# Patient Record
Sex: MALE | Race: BLACK OR AFRICAN AMERICAN | NOT HISPANIC OR LATINO | Employment: OTHER | URBAN - METROPOLITAN AREA
[De-identification: names, ages, dates, MRNs, and addresses within clinical notes are randomized per-mention and may not be internally consistent; named-entity substitution may affect disease eponyms.]

---

## 2018-09-03 ENCOUNTER — HOSPITAL ENCOUNTER (OUTPATIENT)
Facility: OTHER | Age: 73
Discharge: HOME | End: 2018-09-04
Attending: EMERGENCY MEDICINE | Admitting: EMERGENCY MEDICINE
Payer: MEDICARE

## 2018-09-03 DIAGNOSIS — R79.89 ELEVATED TROPONIN: ICD-10-CM

## 2018-09-03 DIAGNOSIS — I50.23 ACUTE ON CHRONIC SYSTOLIC CHF (CONGESTIVE HEART FAILURE): Primary | ICD-10-CM

## 2018-09-03 DIAGNOSIS — R11.2 NAUSEA AND VOMITING IN ADULT: ICD-10-CM

## 2018-09-03 DIAGNOSIS — F43.29 STRESS AND ADJUSTMENT REACTION: ICD-10-CM

## 2018-09-03 DIAGNOSIS — G47.00 INSOMNIA, UNSPECIFIED TYPE: ICD-10-CM

## 2018-09-03 DIAGNOSIS — Z91.89 AT RISK FOR CORONARY ARTERY DISEASE: ICD-10-CM

## 2018-09-03 DIAGNOSIS — I25.10 CAD (CORONARY ARTERY DISEASE): ICD-10-CM

## 2018-09-03 PROBLEM — I10 ESSENTIAL HYPERTENSION: Status: ACTIVE | Noted: 2018-09-03

## 2018-09-03 LAB
ALBUMIN SERPL BCP-MCNC: 3.3 G/DL
ALP SERPL-CCNC: 68 U/L
ALT SERPL W/O P-5'-P-CCNC: 10 U/L
ANION GAP SERPL CALC-SCNC: 11 MMOL/L
AST SERPL-CCNC: 14 U/L
BACTERIA #/AREA URNS HPF: ABNORMAL /HPF
BASOPHILS # BLD AUTO: 0.02 K/UL
BASOPHILS NFR BLD: 0.2 %
BILIRUB SERPL-MCNC: 1 MG/DL
BILIRUB UR QL STRIP: NEGATIVE
BUN SERPL-MCNC: 18 MG/DL
CALCIUM SERPL-MCNC: 9 MG/DL
CHLORIDE SERPL-SCNC: 103 MMOL/L
CLARITY UR: CLEAR
CO2 SERPL-SCNC: 22 MMOL/L
COLOR UR: YELLOW
CREAT SERPL-MCNC: 1.5 MG/DL
DIFFERENTIAL METHOD: ABNORMAL
EOSINOPHIL # BLD AUTO: 0.1 K/UL
EOSINOPHIL NFR BLD: 0.5 %
ERYTHROCYTE [DISTWIDTH] IN BLOOD BY AUTOMATED COUNT: 14.1 %
EST. GFR  (AFRICAN AMERICAN): 52 ML/MIN/1.73 M^2
EST. GFR  (NON AFRICAN AMERICAN): 45 ML/MIN/1.73 M^2
GLUCOSE SERPL-MCNC: 156 MG/DL
GLUCOSE UR QL STRIP: NEGATIVE
HCT VFR BLD AUTO: 36.6 %
HGB BLD-MCNC: 12.1 G/DL
HGB UR QL STRIP: ABNORMAL
KETONES UR QL STRIP: NEGATIVE
LEUKOCYTE ESTERASE UR QL STRIP: ABNORMAL
LIPASE SERPL-CCNC: 22 U/L
LYMPHOCYTES # BLD AUTO: 1.6 K/UL
LYMPHOCYTES NFR BLD: 15.7 %
MCH RBC QN AUTO: 27.3 PG
MCHC RBC AUTO-ENTMCNC: 33.1 G/DL
MCV RBC AUTO: 83 FL
MICROSCOPIC COMMENT: ABNORMAL
MONOCYTES # BLD AUTO: 0.9 K/UL
MONOCYTES NFR BLD: 8.4 %
NEUTROPHILS # BLD AUTO: 7.8 K/UL
NEUTROPHILS NFR BLD: 75 %
NITRITE UR QL STRIP: NEGATIVE
PH UR STRIP: 6 [PH] (ref 5–8)
PLATELET # BLD AUTO: 123 K/UL
PMV BLD AUTO: 11.9 FL
POTASSIUM SERPL-SCNC: 4.1 MMOL/L
PROT SERPL-MCNC: 7.4 G/DL
PROT UR QL STRIP: ABNORMAL
RBC # BLD AUTO: 4.43 M/UL
RBC #/AREA URNS HPF: 5 /HPF (ref 0–4)
SODIUM SERPL-SCNC: 136 MMOL/L
SP GR UR STRIP: 1.02 (ref 1–1.03)
SQUAMOUS #/AREA URNS HPF: 8 /HPF
TROPONIN I SERPL DL<=0.01 NG/ML-MCNC: 0.03 NG/ML
TROPONIN I SERPL DL<=0.01 NG/ML-MCNC: 0.03 NG/ML
TROPONIN I SERPL DL<=0.01 NG/ML-MCNC: 0.04 NG/ML
TROPONIN I SERPL DL<=0.01 NG/ML-MCNC: 0.04 NG/ML
URN SPEC COLLECT METH UR: ABNORMAL
UROBILINOGEN UR STRIP-ACNC: NEGATIVE EU/DL
WBC # BLD AUTO: 10.34 K/UL
WBC #/AREA URNS HPF: 5 /HPF (ref 0–5)
WBC CLUMPS URNS QL MICRO: ABNORMAL
YEAST URNS QL MICRO: ABNORMAL

## 2018-09-03 PROCEDURE — 83690 ASSAY OF LIPASE: CPT

## 2018-09-03 PROCEDURE — G0378 HOSPITAL OBSERVATION PER HR: HCPCS

## 2018-09-03 PROCEDURE — 63600175 PHARM REV CODE 636 W HCPCS: Performed by: NURSE PRACTITIONER

## 2018-09-03 PROCEDURE — 99220 PR INITIAL OBSERVATION CARE,LEVL III: CPT | Mod: ,,, | Performed by: HOSPITALIST

## 2018-09-03 PROCEDURE — 80053 COMPREHEN METABOLIC PANEL: CPT

## 2018-09-03 PROCEDURE — 94761 N-INVAS EAR/PLS OXIMETRY MLT: CPT

## 2018-09-03 PROCEDURE — 99284 EMERGENCY DEPT VISIT MOD MDM: CPT | Mod: 25

## 2018-09-03 PROCEDURE — 63600175 PHARM REV CODE 636 W HCPCS: Performed by: EMERGENCY MEDICINE

## 2018-09-03 PROCEDURE — 85025 COMPLETE CBC W/AUTO DIFF WBC: CPT

## 2018-09-03 PROCEDURE — 84484 ASSAY OF TROPONIN QUANT: CPT

## 2018-09-03 PROCEDURE — 81000 URINALYSIS NONAUTO W/SCOPE: CPT

## 2018-09-03 PROCEDURE — 25000003 PHARM REV CODE 250: Performed by: INTERNAL MEDICINE

## 2018-09-03 PROCEDURE — 96361 HYDRATE IV INFUSION ADD-ON: CPT

## 2018-09-03 PROCEDURE — 25000003 PHARM REV CODE 250: Performed by: NURSE PRACTITIONER

## 2018-09-03 PROCEDURE — 93005 ELECTROCARDIOGRAM TRACING: CPT

## 2018-09-03 PROCEDURE — 36415 COLL VENOUS BLD VENIPUNCTURE: CPT

## 2018-09-03 PROCEDURE — 96374 THER/PROPH/DIAG INJ IV PUSH: CPT

## 2018-09-03 PROCEDURE — 93010 ELECTROCARDIOGRAM REPORT: CPT | Mod: ,,, | Performed by: INTERNAL MEDICINE

## 2018-09-03 PROCEDURE — 84484 ASSAY OF TROPONIN QUANT: CPT | Mod: 91

## 2018-09-03 PROCEDURE — 25000003 PHARM REV CODE 250: Performed by: EMERGENCY MEDICINE

## 2018-09-03 RX ORDER — DIPHENHYDRAMINE HCL 25 MG
25 CAPSULE ORAL EVERY 6 HOURS PRN
Status: DISCONTINUED | OUTPATIENT
Start: 2018-09-03 | End: 2018-09-04 | Stop reason: HOSPADM

## 2018-09-03 RX ORDER — ASPIRIN 81 MG/1
81 TABLET ORAL DAILY
Status: DISCONTINUED | OUTPATIENT
Start: 2018-09-03 | End: 2018-09-04 | Stop reason: HOSPADM

## 2018-09-03 RX ORDER — CARVEDILOL 12.5 MG/1
12.5 TABLET ORAL 2 TIMES DAILY WITH MEALS
Status: DISCONTINUED | OUTPATIENT
Start: 2018-09-03 | End: 2018-09-04 | Stop reason: HOSPADM

## 2018-09-03 RX ORDER — HEPARIN SODIUM 5000 [USP'U]/ML
5000 INJECTION, SOLUTION INTRAVENOUS; SUBCUTANEOUS EVERY 8 HOURS
Status: DISCONTINUED | OUTPATIENT
Start: 2018-09-03 | End: 2018-09-04 | Stop reason: HOSPADM

## 2018-09-03 RX ORDER — ASPIRIN 81 MG/1
81 TABLET ORAL DAILY
COMMUNITY

## 2018-09-03 RX ORDER — ONDANSETRON 2 MG/ML
4 INJECTION INTRAMUSCULAR; INTRAVENOUS
Status: COMPLETED | OUTPATIENT
Start: 2018-09-03 | End: 2018-09-03

## 2018-09-03 RX ORDER — ONDANSETRON 8 MG/1
8 TABLET, ORALLY DISINTEGRATING ORAL EVERY 8 HOURS PRN
Status: DISCONTINUED | OUTPATIENT
Start: 2018-09-03 | End: 2018-09-04 | Stop reason: HOSPADM

## 2018-09-03 RX ORDER — LOSARTAN POTASSIUM 100 MG/1
100 TABLET ORAL DAILY
COMMUNITY

## 2018-09-03 RX ORDER — MORPHINE SULFATE 2 MG/ML
2 INJECTION, SOLUTION INTRAMUSCULAR; INTRAVENOUS EVERY 4 HOURS PRN
Status: DISCONTINUED | OUTPATIENT
Start: 2018-09-03 | End: 2018-09-04 | Stop reason: HOSPADM

## 2018-09-03 RX ORDER — ASPIRIN 325 MG
325 TABLET, DELAYED RELEASE (ENTERIC COATED) ORAL
Status: COMPLETED | OUTPATIENT
Start: 2018-09-03 | End: 2018-09-03

## 2018-09-03 RX ORDER — ATORVASTATIN CALCIUM 20 MG/1
40 TABLET, FILM COATED ORAL DAILY
Status: DISCONTINUED | OUTPATIENT
Start: 2018-09-03 | End: 2018-09-04 | Stop reason: HOSPADM

## 2018-09-03 RX ORDER — SODIUM CHLORIDE 0.9 % (FLUSH) 0.9 %
5 SYRINGE (ML) INJECTION
Status: DISCONTINUED | OUTPATIENT
Start: 2018-09-03 | End: 2018-09-04 | Stop reason: HOSPADM

## 2018-09-03 RX ORDER — LOSARTAN POTASSIUM 50 MG/1
100 TABLET ORAL DAILY
Status: DISCONTINUED | OUTPATIENT
Start: 2018-09-03 | End: 2018-09-04 | Stop reason: HOSPADM

## 2018-09-03 RX ORDER — SODIUM CHLORIDE 9 MG/ML
1000 INJECTION, SOLUTION INTRAVENOUS
Status: COMPLETED | OUTPATIENT
Start: 2018-09-03 | End: 2018-09-03

## 2018-09-03 RX ORDER — CARVEDILOL 12.5 MG/1
12.5 TABLET ORAL 2 TIMES DAILY WITH MEALS
COMMUNITY

## 2018-09-03 RX ORDER — ACETAMINOPHEN 325 MG/1
650 TABLET ORAL EVERY 4 HOURS PRN
Status: DISCONTINUED | OUTPATIENT
Start: 2018-09-03 | End: 2018-09-04 | Stop reason: HOSPADM

## 2018-09-03 RX ADMIN — HEPARIN SODIUM 5000 UNITS: 5000 INJECTION, SOLUTION INTRAVENOUS; SUBCUTANEOUS at 01:09

## 2018-09-03 RX ADMIN — HEPARIN SODIUM 5000 UNITS: 5000 INJECTION, SOLUTION INTRAVENOUS; SUBCUTANEOUS at 06:09

## 2018-09-03 RX ADMIN — HEPARIN SODIUM 5000 UNITS: 5000 INJECTION, SOLUTION INTRAVENOUS; SUBCUTANEOUS at 09:09

## 2018-09-03 RX ADMIN — ATORVASTATIN CALCIUM 40 MG: 20 TABLET, FILM COATED ORAL at 01:09

## 2018-09-03 RX ADMIN — DIPHENHYDRAMINE HYDROCHLORIDE 25 MG: 25 CAPSULE ORAL at 06:09

## 2018-09-03 RX ADMIN — LOSARTAN POTASSIUM 100 MG: 50 TABLET, FILM COATED ORAL at 08:09

## 2018-09-03 RX ADMIN — CARVEDILOL 12.5 MG: 12.5 TABLET, FILM COATED ORAL at 08:09

## 2018-09-03 RX ADMIN — CARVEDILOL 12.5 MG: 12.5 TABLET, FILM COATED ORAL at 05:09

## 2018-09-03 RX ADMIN — ASPIRIN 81 MG: 81 TABLET, COATED ORAL at 08:09

## 2018-09-03 RX ADMIN — ONDANSETRON 4 MG: 2 INJECTION, SOLUTION INTRAMUSCULAR; INTRAVENOUS at 01:09

## 2018-09-03 RX ADMIN — ASPIRIN 325 MG: 325 TABLET, DELAYED RELEASE ORAL at 04:09

## 2018-09-03 RX ADMIN — SODIUM CHLORIDE 1000 ML: 0.9 INJECTION, SOLUTION INTRAVENOUS at 01:09

## 2018-09-03 NOTE — SUBJECTIVE & OBJECTIVE
Past Medical History:   Diagnosis Date    Heart attack     Hypertension     Prostate cancer        Past Surgical History:   Procedure Laterality Date    CARDIAC SURGERY         Review of patient's allergies indicates:  No Known Allergies    No current facility-administered medications on file prior to encounter.      Current Outpatient Medications on File Prior to Encounter   Medication Sig    aspirin (ECOTRIN) 81 MG EC tablet Take 81 mg by mouth once daily.    carvedilol (COREG) 12.5 MG tablet Take 12.5 mg by mouth 2 (two) times daily with meals.    losartan (COZAAR) 100 MG tablet Take 100 mg by mouth once daily.     Family History     None        Tobacco Use    Smoking status: Never Smoker   Substance and Sexual Activity    Alcohol use: No     Frequency: Never    Drug use: Not on file    Sexual activity: Not on file     Review of Systems   Constitutional: Positive for activity change, appetite change and fatigue. Negative for fever.   HENT: Negative for congestion, ear pain and postnasal drip.    Eyes: Negative for discharge.   Respiratory: Positive for shortness of breath. Negative for apnea and wheezing.    Cardiovascular: Negative for chest pain and leg swelling.   Gastrointestinal: Positive for nausea and vomiting. Negative for abdominal distention and abdominal pain.   Endocrine: Negative for polydipsia, polyphagia and polyuria.   Genitourinary: Negative for difficulty urinating, flank pain, frequency, hematuria and urgency.   Musculoskeletal: Positive for arthralgias and myalgias. Negative for joint swelling.   Skin: Negative for pallor and rash.   Allergic/Immunologic: Negative for environmental allergies and food allergies.   Neurological: Negative for dizziness, speech difficulty, weakness, light-headedness and headaches.   Hematological: Does not bruise/bleed easily.   Psychiatric/Behavioral: Negative for agitation.     Objective:     Vital Signs (Most Recent):  Temp: 99.4 °F (37.4 °C)  (09/03/18 0101)  Pulse: (!) 58 (09/03/18 0411)  Resp: 18 (09/03/18 0411)  BP: (!) 163/73 (09/03/18 0411)  SpO2: 96 % (09/03/18 0411) Vital Signs (24h Range):  Temp:  [99.4 °F (37.4 °C)] 99.4 °F (37.4 °C)  Pulse:  [58-81] 58  Resp:  [16-18] 18  SpO2:  [95 %-98 %] 96 %  BP: (152-197)/(71-94) 163/73     Weight: 83.9 kg (185 lb)  Body mass index is 21.94 kg/m².    Physical Exam   Constitutional: He is oriented to person, place, and time. He appears well-developed and well-nourished.   HENT:   Head: Normocephalic.   Eyes: Conjunctivae are normal.   Neck: Normal range of motion. Neck supple.   Cardiovascular: Regular rhythm, normal heart sounds and intact distal pulses. Bradycardia present.   Pulses:       Radial pulses are 2+ on the right side, and 2+ on the left side.   Pulmonary/Chest: Effort normal and breath sounds normal. Tachypnea noted.   Abdominal: Soft. He exhibits no distension. Bowel sounds are decreased. There is no tenderness.   Musculoskeletal: Normal range of motion.   Neurological: He is alert and oriented to person, place, and time. He has normal strength. GCS eye subscore is 4. GCS verbal subscore is 5. GCS motor subscore is 6.   Skin: Skin is warm and dry.   Psychiatric: He has a normal mood and affect. His speech is normal and behavior is normal.           Significant Labs:   CBC:   Recent Labs   Lab  09/03/18 0111   WBC  10.34   HGB  12.1*   HCT  36.6*   PLT  123*     CMP:   Recent Labs   Lab  09/03/18 0111   NA  136   K  4.1   CL  103   CO2  22*   GLU  156*   BUN  18   CREATININE  1.5*   CALCIUM  9.0   PROT  7.4   ALBUMIN  3.3*   BILITOT  1.0   ALKPHOS  68   AST  14   ALT  10   ANIONGAP  11   EGFRNONAA  45*     Troponin:   Recent Labs   Lab  09/03/18 0111 09/03/18   0308   TROPONINI  0.031*  0.041*       Significant Imaging: I have reviewed all pertinent imaging results/findings within the past 24 hours.

## 2018-09-03 NOTE — HPI
The patient is a 73 y.o. male with hx of MI 1/2015, prostate CA 2016, and HTN who presents with complaint of N/V x 2 hours.There is an associated HA and mild diarrhea. Pt has had increased stress and decreased sleep since the death of his sister last week. Pt had similar sx a few weeks ago after working outside in the heat secondary to heat exhaustion; he did not require ED intervention at that time. Pt was working outside, cutting grass today. He denies any fever, chills, abdominal pain, chest pain, SOB, urinary sx, dizziness, weakness, visual disturbance, neck pain, neck stiffness, and confusion.

## 2018-09-03 NOTE — PLAN OF CARE
09/03/18 1621   Discharge Assessment   Assessment Type Discharge Planning Assessment   Confirmed/corrected address and phone number on facesheet? Yes   Assessment information obtained from? Patient   Communicated expected length of stay with patient/caregiver yes   Prior to hospitilization cognitive status: Alert/Oriented   Prior to hospitalization functional status: Independent   Current cognitive status: Alert/Oriented   Current Functional Status: Independent   Lives With alone   Is patient able to care for self after discharge? Yes   Patient's perception of discharge disposition admitted as an inpatient   Patient currently being followed by outpatient case management? Yes   Patient currently receives any other outside agency services? Yes   Equipment Currently Used at Home none   Do you have any problems affording any of your prescribed medications? TBD   Is the patient taking medications as prescribed? yes   Does the patient have transportation home? Yes   Transportation Available none   Does the patient receive services [facility]? Yes   Discharge Plan A Home with family   Discharge Plan B Home with family   Patient/Family In Agreement With Plan yes   ATTN: TEAM   DC PLANNING      RNCM met with patient at the bedside.      Patient is alert and oriented with no communication barriers.      Prior to admission patient was independent with ADLs. Patient denies the use of HH or DME .       Patients PCP [facility] IN [place] ( unable  to find in epic ? Pt will need new pcp - please reassess )  is correct on the face sheet. [facility]  [address] [place] [place] [address]         Patient denies a history of mental illness.         Patients family will transport him home at discharge.         No CM needs identified at this time.       CM team will continue to follow.        NO NEEDS VOICED PER PT  IF  NEEDS ARISE PLEASE CONTACT RNCM TEAM      CHANDAN SIGNED -      Rosalina Berg-  PATT Hernandez  Case management 9/3/11983:20 PM  # [phone] (FAX) [phone]

## 2018-09-03 NOTE — ED NOTES
Pt transferred to room 303 via wheelchair by tech. [facility]

## 2018-09-03 NOTE — ED NOTES
Pt lying down, respirations even/unlabored. NAD noted. Pt denies nausea and cp at this time. Updated pt and family on POC. Pt denies any current needs at this time. Side rails up x2, call bell within reach. Will continue to monitor

## 2018-09-03 NOTE — ED TRIAGE NOTES
"Pt presents to ED with c/o n/v/d and loss of appetite.  x1 day. Pt denies fever, chills, cp,sob, abdominal pain, dysuria. Daughter at bedside states " he has been going through a lot. His sister  last week. He got over heated at work a couple weeks ago and had hallucinations. I don't know if its stress related or not"  "

## 2018-09-03 NOTE — ED PROVIDER NOTES
Encounter Date: 9/3/2018    SCRIBE #1 NOTE: Nessa DAMON am scribing for, and in the presence of, Dr. Solis.       History     Chief Complaint   Patient presents with    Emesis     x 2 hours. patient also c/o insomnia and nausea x 2-3 weeks.      Time seen by provider: 1:15 AM    This is a 73 y.o. male with hx of MI 1/2015, prostate CA 2016, and HTN who presents with complaint of N/V x 2 hours.There is an associated HA and mild diarrhea. Pt has had increased stress and decreased sleep since the death of his sister last week. Pt had similar sx a few weeks ago after working outside in the heat secondary to heat exhaustion; he did not require ED intervention at that time. Pt was working outside, cutting grass today. He denies any fever, chills, abdominal pain, chest pain, SOB, urinary sx, dizziness, weakness, visual disturbance, neck pain, neck stiffness, and confusion.      The history is provided by the patient and a relative (daughter).     Review of patient's allergies indicates:  No Known Allergies  Past Medical History:   Diagnosis Date    Heart attack     Hypertension     Prostate cancer      Past Surgical History:   Procedure Laterality Date    CARDIAC SURGERY       History reviewed. No pertinent family history.  Social History     Tobacco Use    Smoking status: Never Smoker   Substance Use Topics    Alcohol use: No     Frequency: Never    Drug use: Not on file     Review of Systems   Constitutional: Negative for chills and fever.   HENT: Negative for congestion, rhinorrhea and sore throat.    Eyes: Negative for photophobia and visual disturbance.   Respiratory: Negative for cough and shortness of breath.    Cardiovascular: Negative for chest pain.   Gastrointestinal: Positive for diarrhea, nausea and vomiting. Negative for abdominal pain, blood in stool and constipation.   Endocrine: Negative for polyuria.   Genitourinary: Negative for decreased urine volume and dysuria.   Musculoskeletal:  Negative for back pain, neck pain and neck stiffness.   Skin: Negative for rash.   Allergic/Immunologic: Negative for immunocompromised state.   Neurological: Positive for headaches. Negative for dizziness, syncope, weakness, light-headedness and numbness.   Hematological: Does not bruise/bleed easily.   Psychiatric/Behavioral: Positive for sleep disturbance. Negative for confusion.       Physical Exam     Initial Vitals [09/03/18 0101]   BP Pulse Resp Temp SpO2   (!) 197/94 81 16 99.4 °F (37.4 °C) 98 %      MAP       --         Physical Exam    Nursing note and vitals reviewed.  Constitutional: He appears well-developed and well-nourished. He is not diaphoretic. No distress.   HENT:   Head: Normocephalic and atraumatic.   Right Ear: External ear normal.   Left Ear: External ear normal.   Eyes: Right eye exhibits no discharge. Left eye exhibits no discharge.   Neck: Normal range of motion. Neck supple.   Cardiovascular: Normal rate, regular rhythm, normal heart sounds and intact distal pulses. Exam reveals no gallop and no friction rub.    No murmur heard.  Pulmonary/Chest: Breath sounds normal. No respiratory distress. He has no wheezes. He has no rhonchi. He has no rales. He exhibits no tenderness.   Abdominal: Soft. Bowel sounds are normal. He exhibits no distension. There is no tenderness. There is no rebound and no guarding.   Musculoskeletal: Normal range of motion. He exhibits no edema or tenderness.   Lymphadenopathy:     He has no cervical adenopathy.   Neurological: He is alert and oriented to person, place, and time. He has normal strength. No sensory deficit.   Skin: Skin is warm and dry. No rash noted. No erythema.   Psychiatric: He has a normal mood and affect. His behavior is normal. Judgment and thought content normal.         ED Course   Procedures  Labs Reviewed   CBC W/ AUTO DIFFERENTIAL - Abnormal; Notable for the following components:       Result Value    RBC 4.43 (*)     Hemoglobin 12.1 (*)      Hematocrit 36.6 (*)     Platelets 123 (*)     Gran # (ANC) 7.8 (*)     Gran% 75.0 (*)     Lymph% 15.7 (*)     All other components within normal limits   COMPREHENSIVE METABOLIC PANEL - Abnormal; Notable for the following components:    CO2 22 (*)     Glucose 156 (*)     Creatinine 1.5 (*)     Albumin 3.3 (*)     eGFR if  52 (*)     eGFR if non  45 (*)     All other components within normal limits   URINALYSIS - Abnormal; Notable for the following components:    Protein, UA Trace (*)     Occult Blood UA 2+ (*)     Leukocytes, UA 1+ (*)     All other components within normal limits   URINALYSIS MICROSCOPIC - Abnormal; Notable for the following components:    RBC, UA 5 (*)     All other components within normal limits   TROPONIN I - Abnormal; Notable for the following components:    Troponin I 0.031 (*)     All other components within normal limits   TROPONIN I - Abnormal; Notable for the following components:    Troponin I 0.041 (*)     All other components within normal limits   LIPASE     EKG Readings: (Independently Interpreted)   NSR at a rate of 62. T wave inversions in leads II, III, AVF, V4. No ST changes. No old for comparison.        Medical Decision Making:   Clinical Tests:   Lab Tests: Ordered and Reviewed  ED Management:    4:08 AM - Discussed pt with Daniel Sharp NP. Will admit pt to Dr. Laguna.  Other:   I have discussed this case with another health care provider.    Additional MDM:   Comments: 73-year-old male presents complaining of nausea throughout the day with vomiting tonight.  He denies any associated pain.  According to his daughter the patient has been under increased stress secondary to various death in the family.  His exam was unremarkable. Vital signs were significant for an elevated blood pressure only.  Labs were obtained including CBC, CMP, and lipase as well as urinalysis.  Lab showed no significant abnormalities.  EKG did show T-wave inversions  in inferior and lateral leads.  There are no old EKGs for comparison.  Troponin was obtained and mildly elevated at 0.031.  Repeat troponin was 0.041.  Given history of CAD, abnormal EKG and rising troponin, he will be admitted for repeat troponin and further evaluation.  Case has been discussed with the moonlighter and patient will be admitted to the hospitalist service..          Scribe Attestation:   Scribe #1: I performed the above scribed service and the documentation accurately describes the services I performed. I attest to the accuracy of the note.    Attending Attestation:           Physician Attestation for Scribe:  Physician Attestation Statement for Scribe #1: I, Dr. Solis, reviewed documentation, as scribed by Nessa Story in my presence, and it is both accurate and complete.                    Clinical Impression:     1. Nausea and vomiting in adult    2. Elevated troponin    3. Insomnia, unspecified type    4. Stress and adjustment reaction                                 Suzie Solis MD  09/03/18 0411

## 2018-09-03 NOTE — H&P
[facility] Hospital Medicine  History & Physical    Patient Name: Brandon Carl Jr.  MRN: [id number]  Admission Date: 9/3/2018  Attending Physician: Torito Naylor MD   Primary Care Provider: No primary care provider on file.         Patient information was obtained from patient, past medical records and ER records.     Subjective:     Principal Problem:Elevated troponin    Chief Complaint:   Chief Complaint   Patient presents with    Emesis     x 2 hours. patient also c/o insomnia and nausea x 2-3 weeks.         HPI: The patient is a 73 y.o. male with hx of MI 1/2015, prostate CA 2016, and HTN who presents with complaint of N/V x 2 hours.There is an associated HA and mild diarrhea. Pt has had increased stress and decreased sleep since the death of his sister last week. Pt had similar sx a few weeks ago after working outside in the heat secondary to heat exhaustion; he did not require ED intervention at that time. Pt was working outside, cutting grass today. He denies any fever, chills, abdominal pain, chest pain, SOB, urinary sx, dizziness, weakness, visual disturbance, neck pain, neck stiffness, and confusion.        Past Medical History:   Diagnosis Date    Heart attack     Hypertension     Prostate cancer        Past Surgical History:   Procedure Laterality Date    CARDIAC SURGERY         Review of patient's allergies indicates:  No Known Allergies    No current facility-administered medications on file prior to encounter.      Current Outpatient Medications on File Prior to Encounter   Medication Sig    aspirin (ECOTRIN) 81 MG EC tablet Take 81 mg by mouth once daily.    carvedilol (COREG) 12.5 MG tablet Take 12.5 mg by mouth 2 (two) times daily with meals.    losartan (COZAAR) 100 MG tablet Take 100 mg by mouth once daily.     Family History     None        Tobacco Use    Smoking status: Never Smoker   Substance and Sexual Activity    Alcohol use: No     Frequency: Never     Drug use: Not on file    Sexual activity: Not on file     Review of Systems   Constitutional: Positive for activity change, appetite change and fatigue. Negative for fever.   HENT: Negative for congestion, ear pain and postnasal drip.    Eyes: Negative for discharge.   Respiratory: Positive for shortness of breath. Negative for apnea and wheezing.    Cardiovascular: Negative for chest pain and leg swelling.   Gastrointestinal: Positive for nausea and vomiting. Negative for abdominal distention and abdominal pain.   Endocrine: Negative for polydipsia, polyphagia and polyuria.   Genitourinary: Negative for difficulty urinating, flank pain, frequency, hematuria and urgency.   Musculoskeletal: Positive for arthralgias and myalgias. Negative for joint swelling.   Skin: Negative for pallor and rash.   Allergic/Immunologic: Negative for environmental allergies and food allergies.   Neurological: Negative for dizziness, speech difficulty, weakness, light-headedness and headaches.   Hematological: Does not bruise/bleed easily.   Psychiatric/Behavioral: Negative for agitation.     Objective:     Vital Signs (Most Recent):  Temp: 99.4 °F (37.4 °C) (09/03/18 0101)  Pulse: (!) 58 (09/03/18 0411)  Resp: 18 (09/03/18 0411)  BP: (!) 163/73 (09/03/18 0411)  SpO2: 96 % (09/03/18 0411) Vital Signs (24h Range):  Temp:  [99.4 °F (37.4 °C)] 99.4 °F (37.4 °C)  Pulse:  [58-81] 58  Resp:  [16-18] 18  SpO2:  [95 %-98 %] 96 %  BP: (152-197)/(71-94) 163/73     Weight: 83.9 kg (185 lb)  Body mass index is 21.94 kg/m².    Physical Exam   Constitutional: He is oriented to person, place, and time. He appears well-developed and well-nourished.   HENT:   Head: Normocephalic.   Eyes: Conjunctivae are normal.   Neck: Normal range of motion. Neck supple.   Cardiovascular: Regular rhythm, normal heart sounds and intact distal pulses. Bradycardia present.   Pulses:       Radial pulses are 2+ on the right side, and 2+ on the left side.   Pulmonary/Chest:  Effort normal and breath sounds normal. Tachypnea noted.   Abdominal: Soft. He exhibits no distension. Bowel sounds are decreased. There is no tenderness.   Musculoskeletal: Normal range of motion.   Neurological: He is alert and oriented to person, place, and time. He has normal strength. GCS eye subscore is 4. GCS verbal subscore is 5. GCS motor subscore is 6.   Skin: Skin is warm and dry.   Psychiatric: He has a normal mood and affect. His speech is normal and behavior is normal.           Significant Labs:   CBC:   Recent Labs   Lab  09/03/18 0111   WBC  10.34   HGB  12.1*   HCT  36.6*   PLT  123*     CMP:   Recent Labs   Lab  09/03/18 0111   NA  136   K  4.1   CL  103   CO2  22*   GLU  156*   BUN  18   CREATININE  1.5*   CALCIUM  9.0   PROT  7.4   ALBUMIN  3.3*   BILITOT  1.0   ALKPHOS  68   AST  14   ALT  10   ANIONGAP  11   EGFRNONAA  45*     Troponin:   Recent Labs   Lab  09/03/18 0111 09/03/18   0308   TROPONINI  0.031*  0.041*       Significant Imaging: I have reviewed all pertinent imaging results/findings within the past 24 hours.    Assessment/Plan:     * Elevated troponin    Troponin- .031,.041    Trend Troponin  Lipid panel pending          Essential hypertension    Hypertensive currently    Continue Coreg and losartan          Nausea and vomiting in adult    N/V off and on for 2 weeks. Worsened today    Zofran for N/V            VTE Risk Mitigation (From admission, onward)        Ordered     heparin (porcine) injection 5,000 Units  Every 8 hours      09/03/18 0451     Place sequential compression device  Until discontinued      09/03/18 0451     IP VTE HIGH RISK PATIENT  Once      09/03/18 0451             Alexandro Sharp NP  Department of Hospital Medicine   [facility]

## 2018-09-03 NOTE — NURSING
Pt arrived to unit via wheelchair, ambulated unassisted to bed. No complaints of pain, will continue to monitor.

## 2018-09-04 VITALS
BODY MASS INDEX: 23.19 KG/M2 | HEIGHT: 77 IN | SYSTOLIC BLOOD PRESSURE: 166 MMHG | OXYGEN SATURATION: 95 % | TEMPERATURE: 98 F | WEIGHT: 196.44 LBS | DIASTOLIC BLOOD PRESSURE: 86 MMHG | RESPIRATION RATE: 18 BRPM | HEART RATE: 73 BPM

## 2018-09-04 PROBLEM — R50.9 FEVER: Status: ACTIVE | Noted: 2018-09-04

## 2018-09-04 PROBLEM — I50.23 ACUTE ON CHRONIC SYSTOLIC CHF (CONGESTIVE HEART FAILURE): Status: ACTIVE | Noted: 2018-09-04

## 2018-09-04 PROBLEM — E83.42 HYPOMAGNESEMIA: Status: ACTIVE | Noted: 2018-09-04

## 2018-09-04 LAB
ANION GAP SERPL CALC-SCNC: 10 MMOL/L
BASOPHILS # BLD AUTO: 0.01 K/UL
BASOPHILS NFR BLD: 0.1 %
BUN SERPL-MCNC: 14 MG/DL
CALCIUM SERPL-MCNC: 8.8 MG/DL
CHLORIDE SERPL-SCNC: 104 MMOL/L
CHOLEST SERPL-MCNC: 95 MG/DL
CHOLEST/HDLC SERPL: 3.5 {RATIO}
CO2 SERPL-SCNC: 25 MMOL/L
CREAT SERPL-MCNC: 1.4 MG/DL
DIASTOLIC DYSFUNCTION: NO
DIFFERENTIAL METHOD: ABNORMAL
EOSINOPHIL # BLD AUTO: 0.1 K/UL
EOSINOPHIL NFR BLD: 0.9 %
ERYTHROCYTE [DISTWIDTH] IN BLOOD BY AUTOMATED COUNT: 14.5 %
EST. GFR  (AFRICAN AMERICAN): 57 ML/MIN/1.73 M^2
EST. GFR  (NON AFRICAN AMERICAN): 49 ML/MIN/1.73 M^2
GLUCOSE SERPL-MCNC: 103 MG/DL
HCT VFR BLD AUTO: 34.9 %
HDLC SERPL-MCNC: 27 MG/DL
HDLC SERPL: 28.4 %
HGB BLD-MCNC: 11.5 G/DL
LDLC SERPL CALC-MCNC: 56.2 MG/DL
LYMPHOCYTES # BLD AUTO: 2.4 K/UL
LYMPHOCYTES NFR BLD: 27.4 %
MAGNESIUM SERPL-MCNC: 1.3 MG/DL
MCH RBC QN AUTO: 27.2 PG
MCHC RBC AUTO-ENTMCNC: 33 G/DL
MCV RBC AUTO: 83 FL
MONOCYTES # BLD AUTO: 1.2 K/UL
MONOCYTES NFR BLD: 13.5 %
NEUTROPHILS # BLD AUTO: 5.1 K/UL
NEUTROPHILS NFR BLD: 58 %
NONHDLC SERPL-MCNC: 68 MG/DL
PHOSPHATE SERPL-MCNC: 2.2 MG/DL
PLATELET # BLD AUTO: 132 K/UL
PMV BLD AUTO: 12.8 FL
POTASSIUM SERPL-SCNC: 4.1 MMOL/L
RBC # BLD AUTO: 4.23 M/UL
SODIUM SERPL-SCNC: 139 MMOL/L
TRIGL SERPL-MCNC: 59 MG/DL
WBC # BLD AUTO: 8.72 K/UL

## 2018-09-04 PROCEDURE — 94761 N-INVAS EAR/PLS OXIMETRY MLT: CPT

## 2018-09-04 PROCEDURE — 93306 TTE W/DOPPLER COMPLETE: CPT

## 2018-09-04 PROCEDURE — 25000003 PHARM REV CODE 250: Performed by: NURSE PRACTITIONER

## 2018-09-04 PROCEDURE — 25000003 PHARM REV CODE 250: Performed by: INTERNAL MEDICINE

## 2018-09-04 PROCEDURE — 63600175 PHARM REV CODE 636 W HCPCS: Performed by: NURSE PRACTITIONER

## 2018-09-04 PROCEDURE — 85025 COMPLETE CBC W/AUTO DIFF WBC: CPT

## 2018-09-04 PROCEDURE — 80061 LIPID PANEL: CPT

## 2018-09-04 PROCEDURE — 84100 ASSAY OF PHOSPHORUS: CPT

## 2018-09-04 PROCEDURE — 93017 CV STRESS TEST TRACING ONLY: CPT

## 2018-09-04 PROCEDURE — 63600175 PHARM REV CODE 636 W HCPCS: Performed by: HOSPITALIST

## 2018-09-04 PROCEDURE — G0378 HOSPITAL OBSERVATION PER HR: HCPCS

## 2018-09-04 PROCEDURE — 99226 PR SUBSEQUENT OBSERVATION CARE,LEVEL III: CPT | Mod: ,,, | Performed by: HOSPITALIST

## 2018-09-04 PROCEDURE — 80048 BASIC METABOLIC PNL TOTAL CA: CPT

## 2018-09-04 PROCEDURE — 83735 ASSAY OF MAGNESIUM: CPT

## 2018-09-04 PROCEDURE — 36415 COLL VENOUS BLD VENIPUNCTURE: CPT

## 2018-09-04 RX ORDER — FUROSEMIDE 20 MG/1
20 TABLET ORAL DAILY
Qty: 30 TABLET | Refills: 1 | Status: SHIPPED | OUTPATIENT
Start: 2018-09-05 | End: 2019-09-05

## 2018-09-04 RX ORDER — FUROSEMIDE 20 MG/1
20 TABLET ORAL DAILY
Status: DISCONTINUED | OUTPATIENT
Start: 2018-09-05 | End: 2018-09-04 | Stop reason: HOSPADM

## 2018-09-04 RX ORDER — MAGNESIUM SULFATE HEPTAHYDRATE 40 MG/ML
2 INJECTION, SOLUTION INTRAVENOUS ONCE
Status: COMPLETED | OUTPATIENT
Start: 2018-09-04 | End: 2018-09-04

## 2018-09-04 RX ORDER — CHOLECALCIFEROL (VITAMIN D3) 50 MCG
1 TABLET ORAL DAILY
COMMUNITY

## 2018-09-04 RX ORDER — ATORVASTATIN CALCIUM 40 MG/1
40 TABLET, FILM COATED ORAL DAILY
Qty: 30 TABLET | Refills: 1 | Status: CANCELLED | OUTPATIENT
Start: 2018-09-05 | End: 2019-09-05

## 2018-09-04 RX ORDER — ATORVASTATIN CALCIUM 80 MG/1
80 TABLET, FILM COATED ORAL DAILY
COMMUNITY

## 2018-09-04 RX ADMIN — HEPARIN SODIUM 5000 UNITS: 5000 INJECTION, SOLUTION INTRAVENOUS; SUBCUTANEOUS at 02:09

## 2018-09-04 RX ADMIN — MAGNESIUM SULFATE HEPTAHYDRATE 2 G: 40 INJECTION, SOLUTION INTRAVENOUS at 02:09

## 2018-09-04 RX ADMIN — CARVEDILOL 12.5 MG: 12.5 TABLET, FILM COATED ORAL at 08:09

## 2018-09-04 RX ADMIN — HEPARIN SODIUM 5000 UNITS: 5000 INJECTION, SOLUTION INTRAVENOUS; SUBCUTANEOUS at 06:09

## 2018-09-04 RX ADMIN — LOSARTAN POTASSIUM 100 MG: 50 TABLET, FILM COATED ORAL at 08:09

## 2018-09-04 RX ADMIN — CARVEDILOL 12.5 MG: 12.5 TABLET, FILM COATED ORAL at 04:09

## 2018-09-04 RX ADMIN — ATORVASTATIN CALCIUM 40 MG: 20 TABLET, FILM COATED ORAL at 08:09

## 2018-09-04 RX ADMIN — ASPIRIN 81 MG: 81 TABLET, COATED ORAL at 08:09

## 2018-09-04 NOTE — CONSULTS
"DATE OF CONSULTATION:   09/03/2018    Mr. Carl is a 73-year-old gentleman who presented to the Emergency Room here   with complaints of nausea and vomiting.  He says that he was in his usual state   of good health when he was in [place] about 4 weeks ago.   He got   "overheated," became nauseated, and vomited.  Thereafter upon return to [place], he learnt of his sister's death, went to [place], and says that in   [place], he also had nausea and vomiting.  Her returned to [place], was   well, has had trouble sleeping for the last few days, and started having nausea   and vomiting again.  There was no fever, no chills, no abdominal pain, and no   chest pain.     PAST HISTORY:  He had a heart attack about 2 years ago, had 2 coronary stents   placed by Dr. Alexandro Neely at [facility] in [place].  He   has had surgery for prostate cancer in 2016.  He has had longstanding   hypertension.  He has no history of diabetes and is a nonsmoker.    MEDICATIONS:  His home medications include aspirin, carvedilol, and losartan.    He is not presently taking a statin.      SOCIAL HISTORY:  He does not smoke cigarettes, does not drink alcohol.     PHYSICAL EXAMINATION:  Reveals an alert, pleasant man in no apparent acute   distress, with a blood pressure of 145/74 and a pulse of 57 beats per minute.    The sclerae is nonicteric.  The conjunctivae is pink.  The ENT exam is   unremarkable.  The neck is supple.  There is no jugular venous distention.  The   carotid upstroke is brisk.  There is no carotid bruit.  Chest is clear.  The   heart size is grossly normal.  S1 and S2 are normal.  There is no audible murmur   or gallop.  The abdomen is soft and nontender.  The bowel sounds are normal.    Extremities, there is no clubbing, cyanosis or edema of feet.  Grossly the   neurological exam is intact.  The electrocardiogram done on September 3rd at   3:42 a.m. shows a QS pattern in V1 and V2 " suggesting a septal scar.  Primary T   wave inversions are noted in I, II, III and V4 through V6 suggesting   inferolateral ischemia.  Isolated ventricular ectopic beats were noted.  The   troponin level was 0.041 and 0.039.     IMPRESSION:   1.  Nausea and vomiting on and off for 4 weeks, etiology uncertain.  2.  Previous history of coronary artery disease, status post placement of 2   stents in the setting of a heart attack 2 years ago in [place].  3.  Present intermittent exertional shortness of breath for the last few weeks.  4.  Minimal troponin elevation in the absence of chest pains, etiology   uncertain.    RECOMMENDATIONS:  1.  Reinstate statin.  2.  We will check an echocardiogram and a nuclear stress test in the morning   tomorrow.  3.  Consider consultation with GI Medicine for evaluation of intermittent nausea   and vomiting.    Thank you for the opportunity of seeing Mr. Carl in consultation.      HERI/DIAZ  dd: 09/03/2018 12:21:50 (CDT)  td: 09/03/2018 20:31:15 (CDT)  Doc ID   #[id number]  Job ID #[id number]    CC:

## 2018-09-04 NOTE — NURSING
Pt discharged. POC reviewed. Pt educated on new medications and follow up. Pt verbalized understanding. IV d/c. Pt tolerated well. Pt waiting for wheel chair transport.

## 2018-09-04 NOTE — ASSESSMENT & PLAN NOTE
-Lipase normal.  -Patient's daughter and myself belive this was due to a grief reaction from the loss of his sister  -This has resolved and he is tolerating PO  -He was seen by GI who recommneded outpatient follow up with GI if his symptoms returned.

## 2018-09-04 NOTE — PROGRESS NOTES
[facility] Hospital Medicine  Progress Note    Patient Name: Brandon Carl Jr.  MRN: [id number]  Patient Class: OP- Observation   Admission Date: 9/3/2018  Length of Stay: 0 days  Attending Physician: James Tony MD  Primary Care Provider: Primary Doctor No        Subjective:     Principal Problem:Elevated troponin    HPI:  The patient is a 73 y.o. male with hx of MI 1/2015, prostate CA 2016, and HTN who presents with complaint of N/V x 2 hours.There is an associated HA and mild diarrhea. Pt has had increased stress and decreased sleep since the death of his sister last week. Pt had similar sx a few weeks ago after working outside in the heat secondary to heat exhaustion; he did not require ED intervention at that time. Pt was working outside, cutting grass today. He denies any fever, chills, abdominal pain, chest pain, SOB, urinary sx, dizziness, weakness, visual disturbance, neck pain, neck stiffness, and confusion.        Hospital Course:  No notes on file    Interval History: No acute events noted.  Denies chest pain and sob.  Spoke with daughter at bedside and patient in echo.  He is very emotional after loss of his sister.    Review of Systems   Constitutional: Negative for activity change and appetite change.   HENT: Negative for congestion.    Eyes: Negative for discharge and itching.   Respiratory: Negative for apnea and chest tightness.    Cardiovascular: Negative for chest pain and leg swelling.   Gastrointestinal: Negative for abdominal distention and abdominal pain.   Endocrine: Negative for cold intolerance.   Genitourinary: Negative for difficulty urinating and dysuria.   Musculoskeletal: Negative for arthralgias and back pain.   Neurological: Negative for dizziness and facial asymmetry.   Psychiatric/Behavioral: Negative for agitation and behavioral problems.     Objective:     Vital Signs (Most Recent):  Temp: 98.5 °F (36.9 °C) (09/04/18 0754)  Pulse: 69 (09/04/18 0800)  Resp:  18 (09/04/18 0754)  BP: (!) 167/89 (09/04/18 0754)  SpO2: 97 % (09/04/18 0754) Vital Signs (24h Range):  Temp:  [98.5 °F (36.9 °C)-100.5 °F (38.1 °C)] 98.5 °F (36.9 °C)  Pulse:  [50-77] 69  Resp:  [18] 18  SpO2:  [95 %-98 %] 97 %  BP: (145-174)/(74-89) 167/89     Weight: 89.1 kg (196 lb 6.9 oz)  Body mass index is 23.29 kg/m².    Intake/Output Summary (Last 24 hours) at 9/4/2018 0959  Last data filed at 9/4/2018 0500  Gross per 24 hour   Intake 960 ml   Output 150 ml   Net 810 ml      Physical Exam   Constitutional: He is oriented to person, place, and time. He appears well-developed and well-nourished.   HENT:   Head: Normocephalic and atraumatic.   Eyes: EOM are normal. Pupils are equal, round, and reactive to light.   Neck: Normal range of motion. Neck supple.   Cardiovascular: Normal rate, regular rhythm and normal heart sounds.   Pulmonary/Chest: Effort normal and breath sounds normal. No respiratory distress.   Abdominal: Soft. Bowel sounds are normal. He exhibits no distension. There is no tenderness.   Musculoskeletal: Normal range of motion. He exhibits no edema.   Neurological: He is oriented to person, place, and time. No cranial nerve deficit. Coordination normal.   Skin: Skin is warm and dry.   Psychiatric: He has a normal mood and affect. His behavior is normal.   Vitals reviewed.      Significant Labs:   A1C: No results for input(s): HGBA1C in the last 4320 hours.  BMP:   Recent Labs   Lab  09/04/18 0425   GLU  103   NA  139   K  4.1   CL  104   CO2  25   BUN  14   CREATININE  1.4   CALCIUM  8.8   MG  1.3*     CBC:   Recent Labs   Lab  09/03/18 0111 09/04/18 0425   WBC  10.34  8.72   HGB  12.1*  11.5*   HCT  36.6*  34.9*   PLT  123*  132*     CMP:   Recent Labs   Lab  09/03/18   0111  09/04/18   0425   NA  136  139   K  4.1  4.1   CL  103  104   CO2  22*  25   GLU  156*  103   BUN  18  14   CREATININE  1.5*  1.4   CALCIUM  9.0  8.8   PROT  7.4   --    ALBUMIN  3.3*   --    BILITOT  1.0   --     ALKPHOS  68   --    AST  14   --    ALT  10   --    ANIONGAP  11  10   EGFRNONAA  45*  49*     Cardiac Markers: No results for input(s): CKMB, MYOGLOBIN, BNP, TROPISTAT in the last 48 hours.  Lipase:   Recent Labs   Lab  09/03/18   0111   LIPASE  22     Lipid Panel:   Recent Labs   Lab  09/04/18   0425   CHOL  95*   HDL  27*   LDLCALC  56.2*   TRIG  59   CHOLHDL  28.4     Magnesium:   Recent Labs   Lab  09/04/18   0425   MG  1.3*     Troponin:   Recent Labs   Lab  09/03/18   0308  09/03/18   0907  09/03/18   1520   TROPONINI  0.041*  0.039*  0.034*     TSH: No results for input(s): TSH in the last 4320 hours.  Urine Studies:   Recent Labs   Lab  09/03/18   0115   COLORU  Yellow   APPEARANCEUA  Clear   PHUR  6.0   SPECGRAV  1.020   PROTEINUA  Trace*   GLUCUA  Negative   KETONESU  Negative   BILIRUBINUA  Negative   OCCULTUA  2+*   NITRITE  Negative   UROBILINOGEN  Negative   LEUKOCYTESUR  1+*   RBCUA  5*   WBCUA  5   BACTERIA  Rare   SQUAMEPITHEL  8       Significant Imaging: I have reviewed and interpreted all pertinent imaging results/findings within the past 24 hours.    Assessment/Plan:      * Elevated troponin    -Troponin minimally elevated but trended down.  -? NSTEMI vs due to undiagnosed systolic chf?  -Await stress test and echo today.  If these are normal would anticipate discharge home later today.  -Continue aspirin, statin and beta blocker  -Appreciate input from Dr. Parkinson.          Hypomagnesemia    -Replace magnesium today.  -Repeat labs in AM.            Essential hypertension    -Continue Coreg and losartan          Nausea and vomiting in adult    -Lipase normal.  -Appears to have resolved.  -?Anginal equivalent?  -Await GI consult.  If cardiac testing is normal and he is not seen by GI today, then he can follow up with them as outaptient..  -Continue Zofran for N/V            VTE Risk Mitigation (From admission, onward)        Ordered     heparin (porcine) injection 5,000 Units  Every 8 hours       09/03/18 0451     Place sequential compression device  Until discontinued      09/03/18 0451     IP VTE HIGH RISK PATIENT  Once      09/03/18 0451              James Tony MD  Department of Hospital Medicine   [facility]

## 2018-09-04 NOTE — ASSESSMENT & PLAN NOTE
-Lipase normal.  -Appears to have resolved.  -?Anginal equivalent?  -Await GI consult.  If cardiac testing is normal and he is not seen by GI today, then he can follow up with them as outaptient..  -Continue Zofran for N/V

## 2018-09-04 NOTE — DISCHARGE SUMMARY
[facility] Hospital Medicine  Discharge Summary      Patient Name: Brandon Carl Jr.  MRN: [id number]  Admission Date: 9/3/2018  Hospital Length of Stay: 0 days  Discharge Date and Time:  09/04/2018 5:18 PM  Attending Physician: James Tony MD   Discharging Provider: aJmes Tony MD  Primary Care Provider: Primary Doctor No      HPI:   The patient is a 73 y.o. male with hx of MI 1/2015, prostate CA 2016, and HTN who presents with complaint of N/V x 2 hours.There is an associated HA and mild diarrhea. Pt has had increased stress and decreased sleep since the death of his sister last week. Pt had similar sx a few weeks ago after working outside in the heat secondary to heat exhaustion; he did not require ED intervention at that time. Pt was working outside, cutting grass today. He denies any fever, chills, abdominal pain, chest pain, SOB, urinary sx, dizziness, weakness, visual disturbance, neck pain, neck stiffness, and confusion.        * No surgery found *      Hospital Course:   Mr. Carl is a 73 year old man with a history of prostate cancer, HTN and CAD who was admitted for evaluation of nausea, vomiting, shortness of breath and grief.  He was admitted to observation telemetry for ACS rule out, stress test and echo.  His troponins were minimally elevated and stable and he had no chest pain.  Nuclear stress testing showed a fixed defect but no reversible perfusion defects or areas of jeopardy.  However, it did show an ejection fraction of 20%, suggesting acute on chronic systolic chf.  Dr. Parkinson was consulted and I discussed the results with him.  Mr. Carl is cleared for discharge and will continue on his home coreg and losartan.  He is not grossly fluid overloaded so will be started on lasix 20mg daily.  He was instructed to follow up with Dr. Parkinson within 1 week to check kidney function and electrolytes and was instructed to maintain a strict low salt diet.  In regards to his  nausea and vomiting, these resolved, and his daughter believes they were due to a grief reaction as he has just suffered the passing of a sister, with whom he had a very close relationship.  He was seen by GI who recommended outpatient follow up if these symptoms returned.     Consults:   Consults (From admission, onward)        Status Ordering Provider     Inpatient consult to Cardiology  Once     Provider:  Madina Langston MD    Completed DEANNE MERCHANT     Inpatient consult to Gastroenterology  Once     Provider:  Juan M Beckford MD    Completed MADINA LANGSTON          * Elevated troponin    -Troponin minimally elevated but trended down.  -He had no chest pain.  -Stress test obtained which showed only a fixed defect suggestive of old ischemic damage and no reversible defects  -No evidence of acute MI.  Mild troponin bump is attributed to chronic systolic chf.  -Continue aspirin, statin and beta blocker  -Appreciate input from Dr. Langston.          Fever    -One isolated fever  -Normal wbc  -No cough, rash, dysuria or diarrhea  -?viral gastritis causing initial nausea vomiting?  -No indication for further testing at this point as he feels well and is eager to go home.          Acute on chronic systolic CHF (congestive heart failure)    -EF noted to be 20%.  Most likely this is chronic due to previous CAD/ischemic insult  -He is overall well compensated and free of symptoms.  -Continue coreg and losartan  -Add lasix 20 daily.  -Instructed in need for weight monitoring and low salt diet  -Follow up with Dr. Langston within 1 week          Hypomagnesemia    -Replace magnesium today.  -Follow up next week to have labs checked with Dr. Langston            Essential hypertension    -Continue Coreg and losartan          Nausea and vomiting in adult    -Lipase normal.  -Patient's daughter and myself belive this was due to a grief reaction from the loss of his sister  -This has resolved and he is tolerating  PO  -He was seen by GI who recommneded outpatient follow up with GI if his symptoms returned.          Final Active Diagnoses:    Diagnosis Date Noted POA    PRINCIPAL PROBLEM:  Elevated troponin [R74.8] 09/03/2018 Unknown    Hypomagnesemia [E83.42] 09/04/2018 No    Acute on chronic systolic CHF (congestive heart failure) [I50.23] 09/04/2018 Yes    Fever [R50.9] 09/04/2018 No    Nausea and vomiting in adult [R11.2] 09/03/2018 Yes    Essential hypertension [I10] 09/03/2018 Unknown      Problems Resolved During this Admission:       Discharged Condition: fair    Disposition: Home or Self Care    Follow Up:  Follow-up Information     Primary Doctor No In 2 weeks.           Morgan Parkinson MD In 1 week.    Specialty:  Cardiology  Contact information:  [address]  [facility] [id number] [phone]             Gastroenterology In 2 weeks.               Patient Instructions:      Ambulatory referral to Gastroenterology   Referral Priority: Routine Referral Type: Consultation   Referral Reason: Specialty Services Required   Requested Specialty: Gastroenterology   Number of Visits Requested: 1     Diet Cardiac     Notify your health care provider if you experience any of the following:  temperature >100.4     Notify your health care provider if you experience any of the following:  increased confusion or weakness     Notify your health care provider if you experience any of the following:  persistent dizziness, light-headedness, or visual disturbances     Notify your health care provider if you experience any of the following:  worsening rash     Notify your health care provider if you experience any of the following:  severe persistent headache     Notify your health care provider if you experience any of the following:  redness, tenderness, or signs of infection (pain, swelling, redness, odor or green/yellow discharge around incision site)     Notify your health care provider if you experience any of the  following:  severe uncontrolled pain     Notify your health care provider if you experience any of the following:  persistent nausea and vomiting or diarrhea     Activity as tolerated       Significant Diagnostic Studies: Labs:   BMP:   Recent Labs   Lab  09/03/18   0111  09/04/18   0425   GLU  156*  103   NA  136  139   K  4.1  4.1   CL  103  104   CO2  22*  25   BUN  18  14   CREATININE  1.5*  1.4   CALCIUM  9.0  8.8   MG   --   1.3*   , CBC   Recent Labs   Lab  09/03/18   0111  09/04/18   0425   WBC  10.34  8.72   HGB  12.1*  11.5*   HCT  36.6*  34.9*   PLT  123*  132*   , Lipid Panel   Lab Results   Component Value Date    CHOL 95 (L) 09/04/2018    HDL 27 (L) 09/04/2018    LDLCALC 56.2 (L) 09/04/2018    TRIG 59 09/04/2018    CHOLHDL 28.4 09/04/2018    and Troponin   Recent Labs   Lab  09/03/18   1520   TROPONINI  0.034*       Pending Diagnostic Studies:     Procedure Component Value Units Date/Time    NM Myocardial Perfusion Spect Multi Pharmacologic [[id number]]     Order Status:  Sent Lab Status:  No result          Medications:  Reconciled Home Medications:      Medication List      START taking these medications    furosemide 20 MG tablet  Commonly known as:  LASIX  Take 1 tablet (20 mg total) by mouth once daily.        CONTINUE taking these medications    aspirin 81 MG EC tablet  Commonly known as:  ECOTRIN  Take 81 mg by mouth once daily.     atorvastatin 80 MG tablet  Commonly known as:  LIPITOR  Take 80 mg by mouth once daily.     carvedilol 12.5 MG tablet  Commonly known as:  COREG  Take 12.5 mg by mouth 2 (two) times daily with meals.     losartan 100 MG tablet  Commonly known as:  COZAAR  Take 100 mg by mouth once daily.     VITAMIN D3 2,000 unit Tab  Generic drug:  cholecalciferol (vitamin D3)  Take 1 tablet by mouth once daily.            Indwelling Lines/Drains at time of discharge:   Lines/Drains/Airways          None          Time spent on the discharge of patient: 35 minutes  Patient was seen  and examined on the date of discharge and determined to be suitable for discharge.         James Tony MD  Department of Hospital Medicine  [facility]

## 2018-09-04 NOTE — PLAN OF CARE
Patient resting in NAD. VSS on RA. Patient denies pain. Safety measures maintained. Pt using call light for assistance. Will continue to monitor.

## 2018-09-04 NOTE — PLAN OF CARE
Discharge Planning:  Patient admitted on 9-3-18  LOS- day 1  He is very familiar with his PCP and cardiologist in [place].  He is in agreement with the care plan.

## 2018-09-04 NOTE — ASSESSMENT & PLAN NOTE
-One isolated fever  -Normal wbc  -No cough, rash, dysuria or diarrhea  -?viral gastritis causing initial nausea vomiting?  -No indication for further testing at this point as he feels well and is eager to go home.

## 2018-09-04 NOTE — CONSULTS
Gastroenterology Consult    9/4/2018 3:20 PM    Consulting Physician:  Carmel Valadez MD  Primary Care Provider: Primary Doctor No    Reason for consultation: N/V    HPI:  Brandon Carl Jr. is a 73 y.o. male with a history of HTN, prior MI, CAD, and prostate cancer who presented with N/V for 2 hours.  He had had a similar episode about a week prior to admission when working outside and he got overheated.  He has also been stressed and not sleeping well after his sister passed away last week.  He has also lost his brother and brother in law in the last few months.  He denies any abdominal pain, constipation, diarrhea or GI bleeding.  He lost a little bit of weight after his sister passed away but this has stabilized.  He denies any prior episodes of N/V other than these 2 recent episodes.  He is not diabetic.  He denies any issues with heartburn/reflux, dysphagia, or odynophagia.  He has never had an EGD.  He has had a colonoscopy prior to his prostate cancer surgery and polyps were removed.  Now that he's been able to talk about his grief related to the death of several family members and he's gotten IV fluids, he's feeling much better.  His nausea and vomiting has now resolved and he's tolerating PO.    Past Medical History:   Diagnosis Date    Heart attack     Hypertension     Prostate cancer        Past Surgical History:   Procedure Laterality Date    CARDIAC SURGERY         Social History     Socioeconomic History    Marital status:      Spouse name: None    Number of children: None    Years of education: None    Highest education level: None   Social Needs    Financial resource strain: None    Food insecurity - worry: None    Food insecurity - inability: None    Transportation needs - medical: None    Transportation needs - non-medical: None   Occupational History    None   Tobacco Use    Smoking status: Never Smoker   Substance and Sexual Activity    Alcohol use: No     Frequency: Never     Drug use: None    Sexual activity: None   Other Topics Concern    None   Social History Narrative    None       History reviewed. No pertinent family history.      Review of patient's allergies indicates:  No Known Allergies      Current Facility-Administered Medications:     acetaminophen tablet 650 mg, 650 mg, Oral, Q4H PRN, Alexandro Sharp NP    aspirin EC tablet 81 mg, 81 mg, Oral, Daily, Alexandro Sharp NP, 81 mg at 09/04/18 0838    atorvastatin tablet 40 mg, 40 mg, Oral, Daily, Morgan Parkinson MD, 40 mg at 09/04/18 0838    carvedilol tablet 12.5 mg, 12.5 mg, Oral, BID WM, Alexandro Sharp NP, 12.5 mg at 09/04/18 0838    diphenhydrAMINE capsule 25 mg, 25 mg, Oral, Q6H PRN, Alexandro Sharp NP, 25 mg at 09/03/18 0643    heparin (porcine) injection 5,000 Units, 5,000 Units, Subcutaneous, Q8H, Alexandro Sharp NP, 5,000 Units at 09/04/18 1421    losartan tablet 100 mg, 100 mg, Oral, Daily, Alexandro Sharp NP, 100 mg at 09/04/18 0838    magnesium sulfate 2g in water 50mL IVPB (premix), 2 g, Intravenous, Once, James Tony MD, 2 g at 09/04/18 1422    morphine injection 2 mg, 2 mg, Intravenous, Q4H PRN, Alexandro Sharp NP    ondansetron disintegrating tablet 8 mg, 8 mg, Oral, Q8H PRN, Alexandro Sharp NP    sodium chloride 0.9% flush 5 mL, 5 mL, Intravenous, PRN, Alexandro Sharp NP      Review of Systems   Constitutional: Negative for chills, fever and weight loss.   HENT: Negative.    Eyes: Negative.    Respiratory: Negative.    Cardiovascular: Negative.    Gastrointestinal: Positive for nausea and vomiting (improved since admission). Negative for abdominal pain, blood in stool, constipation, diarrhea, heartburn and melena.   Genitourinary: Negative.    Musculoskeletal: Negative.    Skin: Negative.    Neurological: Negative.    Endo/Heme/Allergies: Negative.    Psychiatric/Behavioral: Positive for depression (grief after recent loss of several family members).  "The patient has insomnia.          BP (!) 167/89 (BP Location: Left arm, Patient Position: Sitting)   Pulse (!) 59   Temp 98.5 °F (36.9 °C) (Oral)   Resp 18   Ht 6' 5" (1.956 m)   Wt 89.1 kg (196 lb 6.9 oz)   SpO2 97%   BMI 23.29 kg/m²   Physical Exam   Constitutional: He is oriented to person, place, and time. He appears well-developed and well-nourished. No distress.   HENT:   Head: Normocephalic and atraumatic.   Mouth/Throat: Oropharynx is clear and moist.   Eyes: EOM are normal. Pupils are equal, round, and reactive to light. No scleral icterus.   Cardiovascular: Normal rate, regular rhythm and normal heart sounds.   No murmur heard.  Pulmonary/Chest: Effort normal and breath sounds normal. No respiratory distress.   Abdominal: Soft. Bowel sounds are normal. He exhibits no distension. There is no tenderness. There is no rebound and no guarding.   Musculoskeletal: Normal range of motion. He exhibits no edema.   Neurological: He is alert and oriented to person, place, and time.   Skin: Skin is warm and dry. No rash noted.   Vitals reviewed.      Labs:  Lab Results   Component Value Date/Time    WBC 8.72 09/04/2018 04:25 AM    HGB 11.5 (L) 09/04/2018 04:25 AM    HCT 34.9 (L) 09/04/2018 04:25 AM     (L) 09/04/2018 04:25 AM    MCV 83 09/04/2018 04:25 AM     09/04/2018 04:25 AM    K 4.1 09/04/2018 04:25 AM     09/04/2018 04:25 AM    CO2 25 09/04/2018 04:25 AM    BUN 14 09/04/2018 04:25 AM    CREATININE 1.4 09/04/2018 04:25 AM     09/04/2018 04:25 AM    CALCIUM 8.8 09/04/2018 04:25 AM    MG 1.3 (L) 09/04/2018 04:25 AM    PHOS 2.2 (L) 09/04/2018 04:25 AM   ]  Lab Results   Component Value Date/Time    PROT 7.4 09/03/2018 01:11 AM    ALBUMIN 3.3 (L) 09/03/2018 01:11 AM    BILITOT 1.0 09/03/2018 01:11 AM    AST 14 09/03/2018 01:11 AM    ALT 10 09/03/2018 01:11 AM    ALKPHOS 68 09/03/2018 01:11 AM   ]    Imaging and Procedures:  I personally reviewed the imaging/procedures below.  NM " Stress Test 9/4/18:  1. Large fixed defect involving the inferior, inferolateral and inferoseptal walls.  2. The global left ventricular systolic function is impaired with an LV ejection fraction of 22 % and LV dilatation. Wall motion is diminished.    Assessment:  Brandon Carl Jr. is a 73 y.o. male with a history of HTN, prior MI, CAD, and prostate cancer who presented with N/V for 2 hours.    Plan:  - N/V now resolved - difficult to say what etiology is, but this has been a very recent issue since the death of several family members.  Could be anginal equivalent as it seemed to occur with dehydration/exertion, but also could be a manifestation of his grief  - recommended that if the nausea and vomiting recurs in the next several weeks following discharge, he can follow up with us for further evaluation (likely EGD) but since his symptoms have resolved, no need for any additional inpatient workup at this time  - up to date on colonoscopy (2016)  - will sign off.  Please call back with any questions/concerns.    Carmel Valadez MD

## 2018-09-04 NOTE — ASSESSMENT & PLAN NOTE
-Troponin minimally elevated but trended down.  -He had no chest pain.  -Stress test obtained which showed only a fixed defect suggestive of old ischemic damage and no reversible defects  -No evidence of acute MI.  Mild troponin bump is attributed to chronic systolic chf.  -Continue aspirin, statin and beta blocker  -Appreciate input from Dr. Parkinson.

## 2018-09-04 NOTE — SUBJECTIVE & OBJECTIVE
Interval History: No acute events noted.  Denies chest pain and sob.  Spoke with daughter at bedside and patient in echo.  He is very emotional after loss of his sister.    Review of Systems   Constitutional: Negative for activity change and appetite change.   HENT: Negative for congestion.    Eyes: Negative for discharge and itching.   Respiratory: Negative for apnea and chest tightness.    Cardiovascular: Negative for chest pain and leg swelling.   Gastrointestinal: Negative for abdominal distention and abdominal pain.   Endocrine: Negative for cold intolerance.   Genitourinary: Negative for difficulty urinating and dysuria.   Musculoskeletal: Negative for arthralgias and back pain.   Neurological: Negative for dizziness and facial asymmetry.   Psychiatric/Behavioral: Negative for agitation and behavioral problems.     Objective:     Vital Signs (Most Recent):  Temp: 98.5 °F (36.9 °C) (09/04/18 0754)  Pulse: 69 (09/04/18 0800)  Resp: 18 (09/04/18 0754)  BP: (!) 167/89 (09/04/18 0754)  SpO2: 97 % (09/04/18 0754) Vital Signs (24h Range):  Temp:  [98.5 °F (36.9 °C)-100.5 °F (38.1 °C)] 98.5 °F (36.9 °C)  Pulse:  [50-77] 69  Resp:  [18] 18  SpO2:  [95 %-98 %] 97 %  BP: (145-174)/(74-89) 167/89     Weight: 89.1 kg (196 lb 6.9 oz)  Body mass index is 23.29 kg/m².    Intake/Output Summary (Last 24 hours) at 9/4/2018 0959  Last data filed at 9/4/2018 0500  Gross per 24 hour   Intake 960 ml   Output 150 ml   Net 810 ml      Physical Exam   Constitutional: He is oriented to person, place, and time. He appears well-developed and well-nourished.   HENT:   Head: Normocephalic and atraumatic.   Eyes: EOM are normal. Pupils are equal, round, and reactive to light.   Neck: Normal range of motion. Neck supple.   Cardiovascular: Normal rate, regular rhythm and normal heart sounds.   Pulmonary/Chest: Effort normal and breath sounds normal. No respiratory distress.   Abdominal: Soft. Bowel sounds are normal. He exhibits no distension.  There is no tenderness.   Musculoskeletal: Normal range of motion. He exhibits no edema.   Neurological: He is oriented to person, place, and time. No cranial nerve deficit. Coordination normal.   Skin: Skin is warm and dry.   Psychiatric: He has a normal mood and affect. His behavior is normal.   Vitals reviewed.      Significant Labs:   A1C: No results for input(s): HGBA1C in the last 4320 hours.  BMP:   Recent Labs   Lab  09/04/18   0425   GLU  103   NA  139   K  4.1   CL  104   CO2  25   BUN  14   CREATININE  1.4   CALCIUM  8.8   MG  1.3*     CBC:   Recent Labs   Lab  09/03/18   0111 09/04/18   0425   WBC  10.34  8.72   HGB  12.1*  11.5*   HCT  36.6*  34.9*   PLT  123*  132*     CMP:   Recent Labs   Lab  09/03/18   0111 09/04/18   0425   NA  136  139   K  4.1  4.1   CL  103  104   CO2  22*  25   GLU  156*  103   BUN  18  14   CREATININE  1.5*  1.4   CALCIUM  9.0  8.8   PROT  7.4   --    ALBUMIN  3.3*   --    BILITOT  1.0   --    ALKPHOS  68   --    AST  14   --    ALT  10   --    ANIONGAP  11  10   EGFRNONAA  45*  49*     Cardiac Markers: No results for input(s): CKMB, MYOGLOBIN, BNP, TROPISTAT in the last 48 hours.  Lipase:   Recent Labs   Lab  09/03/18   0111   LIPASE  22     Lipid Panel:   Recent Labs   Lab  09/04/18   0425   CHOL  95*   HDL  27*   LDLCALC  56.2*   TRIG  59   CHOLHDL  28.4     Magnesium:   Recent Labs   Lab  09/04/18   0425   MG  1.3*     Troponin:   Recent Labs   Lab  09/03/18   0308  09/03/18   0907  09/03/18   1520   TROPONINI  0.041*  0.039*  0.034*     TSH: No results for input(s): TSH in the last 4320 hours.  Urine Studies:   Recent Labs   Lab  09/03/18   0115   COLORU  Yellow   APPEARANCEUA  Clear   PHUR  6.0   SPECGRAV  1.020   PROTEINUA  Trace*   GLUCUA  Negative   KETONESU  Negative   BILIRUBINUA  Negative   OCCULTUA  2+*   NITRITE  Negative   UROBILINOGEN  Negative   LEUKOCYTESUR  1+*   RBCUA  5*   WBCUA  5   BACTERIA  Rare   SQUAMEPITHEL  8       Significant Imaging: I have  reviewed and interpreted all pertinent imaging results/findings within the past 24 hours.

## 2018-09-04 NOTE — HOSPITAL COURSE
Mr. Carl is a 73 year old man with a history of prostate cancer, HTN and CAD who was admitted for evaluation of nausea, vomiting, shortness of breath and grief.  He was admitted to observation telemetry for ACS rule out, stress test and echo.  His troponins were minimally elevated and stable and he had no chest pain.  Nuclear stress testing showed a fixed defect but no reversible perfusion defects or areas of jeopardy.  However, it did show an ejection fraction of 20%, suggesting acute on chronic systolic chf.  Dr. Parkinson was consulted and I discussed the results with him.  Mr. Carl is cleared for discharge and will continue on his home coreg and losartan.  He is not grossly fluid overloaded so will be started on lasix 20mg daily.  He was instructed to follow up with Dr. Parkinson within 1 week to check kidney function and electrolytes and was instructed to maintain a strict low salt diet.  In regards to his nausea and vomiting, these resolved, and his daughter believes they were due to a grief reaction as he has just suffered the passing of a sister, with whom he had a very close relationship.  He was seen by GI who recommended outpatient follow up if these symptoms returned.

## 2018-09-04 NOTE — PLAN OF CARE
09/04/18 1740   Final Note   Assessment Type Final Discharge Note   Discharge Disposition Home   Hospital Follow Up  Appt(s) scheduled? Yes   Discharge plans and expectations educations in teach back method with documentation complete? Yes   Right Care Referral Info   Post Acute Recommendation No Care   Referral Type see AVS

## 2018-09-04 NOTE — ASSESSMENT & PLAN NOTE
-EF noted to be 20%.  Most likely this is chronic due to previous CAD/ischemic insult  -He is overall well compensated and free of symptoms.  -Continue coreg and losartan  -Add lasix 20 daily.  -Instructed in need for weight monitoring and low salt diet  -Follow up with Dr. Parkinson within 1 week

## 2018-09-06 LAB
AORTIC VALVE REGURGITATION: ABNORMAL
DIASTOLIC DYSFUNCTION: YES
ESTIMATED PA SYSTOLIC PRESSURE: 13.25
MITRAL VALVE REGURGITATION: ABNORMAL
RETIRED EF AND QEF - SEE NOTES: 24 (ref 55–65)

## 2019-11-08 ENCOUNTER — OFFICE VISIT (OUTPATIENT)
Dept: URGENT CARE | Facility: CLINIC | Age: 74
End: 2019-11-08
Payer: MEDICARE

## 2019-11-08 VITALS
HEART RATE: 62 BPM | BODY MASS INDEX: 23.14 KG/M2 | HEIGHT: 77 IN | SYSTOLIC BLOOD PRESSURE: 153 MMHG | OXYGEN SATURATION: 99 % | TEMPERATURE: 98 F | WEIGHT: 196 LBS | DIASTOLIC BLOOD PRESSURE: 96 MMHG | RESPIRATION RATE: 18 BRPM

## 2019-11-08 DIAGNOSIS — J10.1 INFLUENZA A: Primary | ICD-10-CM

## 2019-11-08 DIAGNOSIS — R09.89 CHEST CONGESTION: ICD-10-CM

## 2019-11-08 DIAGNOSIS — R05.9 COUGH: ICD-10-CM

## 2019-11-08 LAB
CTP QC/QA: YES
FLUAV AG NPH QL: POSITIVE
FLUBV AG NPH QL: NEGATIVE

## 2019-11-08 PROCEDURE — 87804 POCT INFLUENZA A/B: ICD-10-PCS | Mod: QW,S$GLB,, | Performed by: PHYSICIAN ASSISTANT

## 2019-11-08 PROCEDURE — 99203 OFFICE O/P NEW LOW 30 MIN: CPT | Mod: S$GLB,,, | Performed by: PHYSICIAN ASSISTANT

## 2019-11-08 PROCEDURE — 87804 INFLUENZA ASSAY W/OPTIC: CPT | Mod: QW,S$GLB,, | Performed by: PHYSICIAN ASSISTANT

## 2019-11-08 PROCEDURE — 99203 PR OFFICE/OUTPT VISIT, NEW, LEVL III, 30-44 MIN: ICD-10-PCS | Mod: S$GLB,,, | Performed by: PHYSICIAN ASSISTANT

## 2019-11-08 RX ORDER — LORATADINE 10 MG/1
10 TABLET ORAL DAILY
Qty: 14 TABLET | Refills: 0 | Status: SHIPPED | OUTPATIENT
Start: 2019-11-08 | End: 2019-11-22

## 2019-11-08 RX ORDER — FLUTICASONE PROPIONATE 50 MCG
2 SPRAY, SUSPENSION (ML) NASAL DAILY
Qty: 1 BOTTLE | Refills: 0 | Status: SHIPPED | OUTPATIENT
Start: 2019-11-08 | End: 2019-11-22

## 2019-11-08 RX ORDER — PREDNISONE 10 MG/1
10 TABLET ORAL DAILY
Qty: 3 TABLET | Refills: 0 | Status: SHIPPED | OUTPATIENT
Start: 2019-11-08 | End: 2019-11-11

## 2019-11-08 RX ORDER — BENZONATATE 200 MG/1
200 CAPSULE ORAL 3 TIMES DAILY PRN
Qty: 30 CAPSULE | Refills: 0 | Status: SHIPPED | OUTPATIENT
Start: 2019-11-08 | End: 2019-11-18

## 2019-11-08 RX ORDER — OSELTAMIVIR PHOSPHATE 75 MG/1
75 CAPSULE ORAL 2 TIMES DAILY
Qty: 10 CAPSULE | Refills: 0 | Status: SHIPPED | OUTPATIENT
Start: 2019-11-08 | End: 2019-11-13

## 2019-11-08 RX ORDER — GUAIFENESIN 600 MG/1
1200 TABLET, EXTENDED RELEASE ORAL 2 TIMES DAILY
Qty: 28 TABLET | Refills: 0 | Status: SHIPPED | OUTPATIENT
Start: 2019-11-08 | End: 2019-11-15

## 2019-11-08 NOTE — PATIENT INSTRUCTIONS
- Rest.    - Drink plenty of fluids.      - Viral upper respiratory infections typically run their course in 10-14 days.     - Tylenol or Ibuprofen as directed as needed for fever/pain. Avoid tylenol if you have a history of liver disease. Do not take ibuprofen if you have a history of GI bleeding, kidney disease, or if you take blood thinners.     - You can take the prescribed loratadine as directed. These are antihistamines that can help with runny nose, nasal congestion, sneezing, and helps to dry up post-nasal drip, which usually causes sore throat and cough.     - You can use Flonase (fluticasone) nasal spray as directed for sinus congestion and postnasal drip. This is a steroid nasal spray that works locally over time to decrease the inflammation in your nose/sinuses and help with allergic symptoms. This is not an quick- relief spray like afrin, but it works well if used daily.  Discontinue if you develop nose bleed  - use nasal saline prior to Flonase.    - Use Ocean Spray Nasal Saline 1-3 puffs each nostril every 2-3 hours then blow out onto tissue. This is to irrigate the nasal passage way to clear the sinus openings. Use until sinus problem resolved.    - you can take guaifenesin 1200 mg twice a day to help loosen mucous    -warm salt water gargles can help with sore throat    - Take the tessalon (benzonatate) as needed as prescribed for cough. If this is not covered by your insurance, Dextromethorphan (DM) is a cough suppressant over the counter (ie. mucinex DM, robitussin, delsym; dayquil/nyquil has DM as well.)    - warm tea with honey can help with cough. Honey is a natural cough suppressant.    - You received a steroid (prednisone) today for inflammation of sinus and lungs..  This can elevate your blood pressure, elevate your blood sugar, water weight gain, nervous energy, redness to the face and dimpling of the skin where the shot goes in.   - Do not use steroids more than 3 times per year.   - If  you have diabetes, please check you blood sugar frequently.  - If you have high blood pressure, please check your blood pressure frequently.     - You have been given an antiviral today for treatment of your condition.    - Please complete the antiviral as directed.  - If the antiviral is Tamiflu: It can cause nausea and/or vomiting due to irritation of the GI tract in some people.  Please take tamiflu with food.     - Follow up with your PCP or specialty clinic as directed in the next 1-2 weeks if not improved or as needed.  You can call [phone] to schedule an appointment with the appropriate provider.      - Go to the ER if you develop new or worsening symptoms.     - You must understand that you have received an Urgent Care treatment only and that you may be released before all of your medical problems are known or treated.   - You, the patient, will arrange for follow up care as instructed.   - If your condition worsens or fails to improve we recommend that you receive another evaluation at the ER immediately or contact your PCP to discuss your concerns or return here.           The Flu (Influenza)     The virus that causes the flu spreads through the air in droplets when someone who has the flu coughs, sneezes, laughs, or talks.   The flu (influenza) is an infection that affects your respiratory tract. This tract is made up of your mouth, nose, and lungs, and the passages between them. Unlike a cold, the flu can make you very ill. And it can lead to pneumonia, a serious lung infection. The flu can have serious complications and even cause death.  Who is at risk for the flu?  Anyone can get the flu. But you are more likely to become infected if you:  · Have a weakened immune system  · Work in a healthcare setting where you may be exposed to flu germs  · Live or work with someone who has the flu  · Havent had an annual flu shot  How does the flu spread?  The flu is caused by a virus. The virus spreads  through the air in droplets when someone who has the flu coughs, sneezes, laughs, or talks. You can become infected when you inhale these viruses directly. You can also become infected when you touch a surface on which the droplets have landed and then transfer the germs to your eyes, nose, or mouth. Touching used tissues, or sharing utensils, drinking glasses, or a toothbrush from an infected person can expose you to flu viruses, too.  What are the symptoms of the flu?  Flu symptoms tend to come on quickly and may last a few days to a few weeks. They include:  · Fever usually higher than 100.4°F  (38°C) and chills  · Sore throat and headache  · Dry cough  · Runny nose  · Tiredness and weakness  · Muscle aches  Who is at risk for flu complications?  For some people, the flu can be very serious. The risk for complications is greater for:  · Children younger than age 5  · Adults ages 65 and older  · People with a chronic illness such as diabetes or heart, kidney, or lung disease  · People who live in a nursing home or long-term care facility   How is the flu treated?  The flu usually gets better after 7 days or so. In some cases, your healthcare provider may prescribe an antiviral medicine. This may help you get well a little sooner. For the medicine to help, you need to take it as soon as possible (ideally within 48 hours) after your symptoms start. If you develop pneumonia or other serious illness, you may need to stay in the hospital.  Easing flu symptoms  · Drink lots of fluids such as water, juice, and warm soup. A good rule is to drink enough so that you urinate your normal amount.  · Get plenty of rest.  · Ask your healthcare provider what to take for fever and pain.  · Call your provider if your fever is 100.4°F (38°C) or higher, or you become dizzy, lightheaded, or short of breath.  Taking steps to protect others  · Wash your hands often, especially after coughing or sneezing. Or clean your hands with an  alcohol-based hand  containing at least 60% alcohol.  · Cough or sneeze into a tissue. Then throw the tissue away and wash your hands. If you dont have a tissue, cough and sneeze into your elbow.  · Stay home until at least 24 hours after you no longer have a fever or chills. Be sure the fever isnt being hidden by fever-reducing medicine.  · Dont share food, utensils, drinking glasses, or a toothbrush with others.  · Ask your healthcare provider if others in your household should get antiviral medicine to help them avoid infection.  How can the flu be prevented?  · One of the best ways to avoid the flu is to get a flu vaccine each year. The virus that causes the flu changes from year to year. For that reason, healthcare providers recommend getting the flu vaccine each year, as soon as it's available in your area. The vaccine is given as a shot. Your healthcare provider can tell you which vaccine is right for you. A nasal spray is also available but is not recommended for the 3861-0010 flu season. The CDC says this is because the nasal spray did not seem to protect against the flu over the last several flu seasons. In the past, it was meant for people ages 2 to 49.  · Wash your hands often. Frequent handwashing is a proven way to help prevent infection.  · Carry an alcohol-based hand gel containing at least 60% alcohol. Use it when you can't use soap and water. Then wash your hands as soon as you can.  · Avoid touching your eyes, nose, and mouth.  · At home and work, clean phones, computer keyboards, and toys often with disinfectant wipes.  · If possible, avoid close contact with others who have the flu or symptoms of the flu.  Handwashing tips  Handwashing is one of the best ways to prevent many common infections. If you are caring for or visiting someone with the flu, wash your hands each time you enter and leave the room. Follow these steps:  · Use warm water and plenty of soap. Rub your hands together  well.  · Clean the whole hand, including under your nails, between your fingers, and up the wrists.  · Wash for at least 15 seconds.  · Rinse, letting the water run down your fingers, not up your wrists.  · Dry your hands well. Use a paper towel to turn off the faucet and open the door.  Using alcohol-based hand   Alcohol-based hand  are also a good choice. Use them when you can't use soap and water. Follow these steps:  · Squeeze about a tablespoon of gel into the palm of one hand.  · Rub your hands together briskly, cleaning the backs of your hands, the palms, between your fingers, and up the wrists.  · Rub until the gel is gone and your hands are completely dry.  Preventing the flu in healthcare settings  The flu is a special concern for people in hospitals and long-term care facilities. To help prevent the spread of flu, many hospitals and nursing homes take these steps:  · Healthcare providers wash their hands or use an alcohol-based hand  before and after treating each patient.  · People with the flu have private rooms and bathrooms or share a room with someone with the same infection.  · People who are at high risk for the flu but don't have it are encouraged to get the flu and pneumonia vaccines.  · All healthcare workers are encouraged or required to get flu shots.   Date Last Reviewed: 12/1/2016  © 7012-6033 [employer] [address] [place] [address]. All rights reserved. This information is not intended as a substitute for professional medical care. Always follow your healthcare professional's instructions.        Influenza (Adult)    Influenza is also called the flu. It is a viral illness that affects the air passages of your lungs. It is different from the common cold. The flu can easily be passed from one to person to another. It may be spread through the air by coughing and sneezing. Or it can be spread by touching the sick person and then touching your  own eyes, nose, or mouth.  The flu starts 1 to 3 days after you are exposed to the flu virus. It may last for 1 to 2 weeks but many people feel tired or fatigued for many weeks afterward. You usually dont need to take antibiotics unless you have a complication. This might be an ear or sinus infection or pneumonia.  Symptoms of the flu may be mild or severe. They can include extreme tiredness (wanting to stay in bed all day), chills, fevers, muscle aches, soreness with eye movement, headache, and a dry, hacking cough.  Home care  Follow these guidelines when caring for yourself at home:  · Avoid being around cigarette smoke, whether yours or other peoples.  · Acetaminophen or ibuprofen will help ease your fever, muscle aches, and headache. Dont give aspirin to anyone younger than 18 who has the flu. Aspirin can harm the liver.  · Nausea and loss of appetite are common with the flu. Eat light meals. Drink 6 to 8 glasses of liquids every day. Good choices are water, sport drinks, soft drinks without caffeine, juices, tea, and soup. Extra fluids will also help loosen secretions in your nose and lungs.  · Over-the-counter cold medicines will not make the flu go away faster. But the medicines may help with coughing, sore throat, and congestion in your nose and sinuses. Dont use a decongestant if you have high blood pressure.  · Stay home until your fever has been gone for at least 24 hours without using medicine to reduce fever.  Follow-up care  Follow up with your healthcare provider, or as advised, if you are not getting better over the next week.  If you are age 65 or older, talk with your provider about getting a pneumococcal vaccine every 5 years. You should also get this vaccine if you have chronic asthma or COPD. All adults should get a flu vaccine every fall. Ask your provider about this.  When to seek medical advice  Call your healthcare provider right away if any of these occur:  · Cough with lots of colored  mucus (sputum) or blood in your mucus  · Chest pain, shortness of breath, wheezing, or trouble breathing  · Severe headache, or face, neck, or ear pain  · New rash with fever  · Fever of 100.4°F (38°C) or higher, or as directed by your healthcare provider  · Confusion, behavior change, or seizure  · Severe weakness or dizziness  · You get a new fever or cough after getting better for a few days  Date Last Reviewed: 1/1/2017  © 8617-2276 [employer]. [address] [place] [address]. All rights reserved. This information is not intended as a substitute for professional medical care. Always follow your healthcare professional's instructions.

## 2019-11-08 NOTE — PROGRESS NOTES
"Subjective:       Patient ID: Brandon Carl Jr. is a 74 y.o. male.    Vitals:  height is 6' 5" (1.956 m) and weight is 88.9 kg (196 lb). His temperature is 97.9 °F (36.6 °C). His blood pressure is 153/96 (abnormal) and his pulse is 62. His respiration is 18 and oxygen saturation is 99%.     Chief Complaint: Sinus Problem (x3 days congestion cough)    Cough, sore throat, headaches, body aches, chest congestion, and nasal congestion congestion for 2-3 days.  No fever.  Denies chest pain or shortness of breath.    Sinus Problem   This is a new problem. The current episode started in the past 7 days. The problem is unchanged. There has been no fever. Associated symptoms include congestion, coughing, headaches and a sore throat. Pertinent negatives include no chills, diaphoresis, ear pain, neck pain, shortness of breath or sinus pressure. Past treatments include nothing.       Constitution: Negative for activity change, appetite change, chills, sweating, fatigue, fever and unexpected weight change.   HENT: Positive for congestion, postnasal drip and sore throat. Negative for ear pain, nosebleeds, foreign body in nose, sinus pain, sinus pressure, trouble swallowing and voice change.    Neck: Negative for neck pain, neck stiffness and painful lymph nodes.   Cardiovascular: Negative for chest pain, leg swelling, palpitations and sob on exertion.   Eyes: Negative for eye redness.   Respiratory: Positive for cough and sputum production. Negative for sleep apnea, chest tightness, bloody sputum, COPD, shortness of breath, stridor, wheezing and asthma.    Gastrointestinal: Negative for nausea and vomiting.   Musculoskeletal: Positive for muscle ache.   Skin: Negative for rash.   Allergic/Immunologic: Negative for seasonal allergies and asthma.   Neurological: Positive for headaches. Negative for dizziness, light-headedness, passing out, speech difficulty, coordination disturbances, loss of balance, history of migraines, altered " mental status, loss of consciousness, numbness and tingling.   Hematologic/Lymphatic: Negative for swollen lymph nodes.   Psychiatric/Behavioral: Negative for altered mental status.       Objective:      Physical Exam   Constitutional: He is oriented to person, place, and time. He appears well-developed and well-nourished. He is cooperative.  Non-toxic appearance. He does not have a sickly appearance. He does not appear ill. No distress.   HENT:   Head: Normocephalic and atraumatic.   Right Ear: Hearing, tympanic membrane, external ear and ear canal normal.   Left Ear: Hearing, tympanic membrane, external ear and ear canal normal.   Nose: Mucosal edema present. No rhinorrhea or nasal deformity. No epistaxis. Right sinus exhibits no maxillary sinus tenderness and no frontal sinus tenderness. Left sinus exhibits no maxillary sinus tenderness and no frontal sinus tenderness.   Mouth/Throat: Uvula is midline, oropharynx is clear and moist and mucous membranes are normal. No trismus in the jaw. Normal dentition. No uvula swelling. No oropharyngeal exudate, posterior oropharyngeal edema, posterior oropharyngeal erythema, tonsillar abscesses or cobblestoning. Tonsils are 1+ on the right. Tonsils are 1+ on the left. No tonsillar exudate.   Eyes: Conjunctivae and lids are normal. No scleral icterus.   Neck: Trachea normal, full passive range of motion without pain and phonation normal. Neck supple. No neck rigidity. No edema and no erythema present.   Cardiovascular: Normal rate, regular rhythm, normal heart sounds, intact distal pulses and normal pulses.   Pulmonary/Chest: Effort normal and breath sounds normal. No accessory muscle usage or stridor. No tachypnea and no bradypnea. No respiratory distress. He has no decreased breath sounds. He has no wheezes. He has no rhonchi. He has no rales.   Abdominal: Normal appearance.   Musculoskeletal: Normal range of motion. He exhibits no edema or deformity.   Lymphadenopathy:         Head (right side): No submandibular, no preauricular and no posterior auricular adenopathy present.        Head (left side): No submandibular, no preauricular and no posterior auricular adenopathy present.     He has no cervical adenopathy.   Neurological: He is alert and oriented to person, place, and time. He exhibits normal muscle tone. Coordination normal.   Skin: Skin is warm, dry, intact, not diaphoretic and not pale.   Psychiatric: He has a normal mood and affect. His speech is normal and behavior is normal. Judgment and thought content normal. Cognition and memory are normal.   Nursing note and vitals reviewed.          Results for orders placed or performed in visit on 11/08/19   POCT Influenza A/B   Result Value Ref Range    Rapid Influenza A Ag Positive (A) Negative    Rapid Influenza B Ag Negative Negative     Acceptable Yes        Assessment:       1. Influenza A    2. Cough    3. Chest congestion        Plan:         Influenza A  -     POCT Influenza A/B  -     loratadine (CLARITIN) 10 mg tablet; Take 1 tablet (10 mg total) by mouth once daily. for 14 days  Dispense: 14 tablet; Refill: 0  -     fluticasone propionate (FLONASE) 50 mcg/actuation nasal spray; 2 sprays (100 mcg total) by Each Nostril route once daily. for 14 days  Dispense: 1 Bottle; Refill: 0  -     oseltamivir (TAMIFLU) 75 MG capsule; Take 1 capsule (75 mg total) by mouth 2 (two) times daily. for 5 days  Dispense: 10 capsule; Refill: 0  -     benzonatate (TESSALON) 200 MG capsule; Take 1 capsule (200 mg total) by mouth 3 (three) times daily as needed for Cough.  Dispense: 30 capsule; Refill: 0    Cough  -     predniSONE (DELTASONE) 10 MG tablet; Take 1 tablet (10 mg total) by mouth once daily. for 3 days  Dispense: 3 tablet; Refill: 0  -     guaiFENesin (MUCINEX) 600 mg 12 hr tablet; Take 2 tablets (1,200 mg total) by mouth 2 (two) times daily. for 7 days  Dispense: 28 tablet; Refill: 0    Chest congestion  -      predniSONE (DELTASONE) 10 MG tablet; Take 1 tablet (10 mg total) by mouth once daily. for 3 days  Dispense: 3 tablet; Refill: 0  -     guaiFENesin (MUCINEX) 600 mg 12 hr tablet; Take 2 tablets (1,200 mg total) by mouth 2 (two) times daily. for 7 days  Dispense: 28 tablet; Refill: 0      Patient Instructions     - Rest.    - Drink plenty of fluids.      - Viral upper respiratory infections typically run their course in 10-14 days.     - Tylenol or Ibuprofen as directed as needed for fever/pain. Avoid tylenol if you have a history of liver disease. Do not take ibuprofen if you have a history of GI bleeding, kidney disease, or if you take blood thinners.     - You can take the prescribed loratadine as directed. These are antihistamines that can help with runny nose, nasal congestion, sneezing, and helps to dry up post-nasal drip, which usually causes sore throat and cough.     - You can use Flonase (fluticasone) nasal spray as directed for sinus congestion and postnasal drip. This is a steroid nasal spray that works locally over time to decrease the inflammation in your nose/sinuses and help with allergic symptoms. This is not an quick- relief spray like afrin, but it works well if used daily.  Discontinue if you develop nose bleed  - use nasal saline prior to Flonase.    - Use Ocean Spray Nasal Saline 1-3 puffs each nostril every 2-3 hours then blow out onto tissue. This is to irrigate the nasal passage way to clear the sinus openings. Use until sinus problem resolved.    - you can take guaifenesin 1200 mg twice a day to help loosen mucous    -warm salt water gargles can help with sore throat    - Take the tessalon (benzonatate) as needed as prescribed for cough. If this is not covered by your insurance, Dextromethorphan (DM) is a cough suppressant over the counter (ie. mucinex DM, robitussin, delsym; dayquil/nyquil has DM as well.)    - warm tea with honey can help with cough. Honey is a natural cough  suppressant.    - You received a steroid (prednisone) today for inflammation of sinus and lungs..  This can elevate your blood pressure, elevate your blood sugar, water weight gain, nervous energy, redness to the face and dimpling of the skin where the shot goes in.   - Do not use steroids more than 3 times per year.   - If you have diabetes, please check you blood sugar frequently.  - If you have high blood pressure, please check your blood pressure frequently.     - You have been given an antiviral today for treatment of your condition.    - Please complete the antiviral as directed.  - If the antiviral is Tamiflu: It can cause nausea and/or vomiting due to irritation of the GI tract in some people.  Please take tamiflu with food.     - Follow up with your PCP or specialty clinic as directed in the next 1-2 weeks if not improved or as needed.  You can call [phone] to schedule an appointment with the appropriate provider.      - Go to the ER if you develop new or worsening symptoms.     - You must understand that you have received an Urgent Care treatment only and that you may be released before all of your medical problems are known or treated.   - You, the patient, will arrange for follow up care as instructed.   - If your condition worsens or fails to improve we recommend that you receive another evaluation at the ER immediately or contact your PCP to discuss your concerns or return here.           The Flu (Influenza)     The virus that causes the flu spreads through the air in droplets when someone who has the flu coughs, sneezes, laughs, or talks.   The flu (influenza) is an infection that affects your respiratory tract. This tract is made up of your mouth, nose, and lungs, and the passages between them. Unlike a cold, the flu can make you very ill. And it can lead to pneumonia, a serious lung infection. The flu can have serious complications and even cause death.  Who is at risk for the flu?  Anyone  can get the flu. But you are more likely to become infected if you:  · Have a weakened immune system  · Work in a healthcare setting where you may be exposed to flu germs  · Live or work with someone who has the flu  · Havent had an annual flu shot  How does the flu spread?  The flu is caused by a virus. The virus spreads through the air in droplets when someone who has the flu coughs, sneezes, laughs, or talks. You can become infected when you inhale these viruses directly. You can also become infected when you touch a surface on which the droplets have landed and then transfer the germs to your eyes, nose, or mouth. Touching used tissues, or sharing utensils, drinking glasses, or a toothbrush from an infected person can expose you to flu viruses, too.  What are the symptoms of the flu?  Flu symptoms tend to come on quickly and may last a few days to a few weeks. They include:  · Fever usually higher than 100.4°F  (38°C) and chills  · Sore throat and headache  · Dry cough  · Runny nose  · Tiredness and weakness  · Muscle aches  Who is at risk for flu complications?  For some people, the flu can be very serious. The risk for complications is greater for:  · Children younger than age 5  · Adults ages 65 and older  · People with a chronic illness such as diabetes or heart, kidney, or lung disease  · People who live in a nursing home or long-term care facility   How is the flu treated?  The flu usually gets better after 7 days or so. In some cases, your healthcare provider may prescribe an antiviral medicine. This may help you get well a little sooner. For the medicine to help, you need to take it as soon as possible (ideally within 48 hours) after your symptoms start. If you develop pneumonia or other serious illness, you may need to stay in the hospital.  Easing flu symptoms  · Drink lots of fluids such as water, juice, and warm soup. A good rule is to drink enough so that you urinate your normal amount.  · Get  plenty of rest.  · Ask your healthcare provider what to take for fever and pain.  · Call your provider if your fever is 100.4°F (38°C) or higher, or you become dizzy, lightheaded, or short of breath.  Taking steps to protect others  · Wash your hands often, especially after coughing or sneezing. Or clean your hands with an alcohol-based hand  containing at least 60% alcohol.  · Cough or sneeze into a tissue. Then throw the tissue away and wash your hands. If you dont have a tissue, cough and sneeze into your elbow.  · Stay home until at least 24 hours after you no longer have a fever or chills. Be sure the fever isnt being hidden by fever-reducing medicine.  · Dont share food, utensils, drinking glasses, or a toothbrush with others.  · Ask your healthcare provider if others in your household should get antiviral medicine to help them avoid infection.  How can the flu be prevented?  · One of the best ways to avoid the flu is to get a flu vaccine each year. The virus that causes the flu changes from year to year. For that reason, healthcare providers recommend getting the flu vaccine each year, as soon as it's available in your area. The vaccine is given as a shot. Your healthcare provider can tell you which vaccine is right for you. A nasal spray is also available but is not recommended for the 4355-2707 flu season. The CDC says this is because the nasal spray did not seem to protect against the flu over the last several flu seasons. In the past, it was meant for people ages 2 to 49.  · Wash your hands often. Frequent handwashing is a proven way to help prevent infection.  · Carry an alcohol-based hand gel containing at least 60% alcohol. Use it when you can't use soap and water. Then wash your hands as soon as you can.  · Avoid touching your eyes, nose, and mouth.  · At home and work, clean phones, computer keyboards, and toys often with disinfectant wipes.  · If possible, avoid close contact with others  who have the flu or symptoms of the flu.  Handwashing tips  Handwashing is one of the best ways to prevent many common infections. If you are caring for or visiting someone with the flu, wash your hands each time you enter and leave the room. Follow these steps:  · Use warm water and plenty of soap. Rub your hands together well.  · Clean the whole hand, including under your nails, between your fingers, and up the wrists.  · Wash for at least 15 seconds.  · Rinse, letting the water run down your fingers, not up your wrists.  · Dry your hands well. Use a paper towel to turn off the faucet and open the door.  Using alcohol-based hand   Alcohol-based hand  are also a good choice. Use them when you can't use soap and water. Follow these steps:  · Squeeze about a tablespoon of gel into the palm of one hand.  · Rub your hands together briskly, cleaning the backs of your hands, the palms, between your fingers, and up the wrists.  · Rub until the gel is gone and your hands are completely dry.  Preventing the flu in healthcare settings  The flu is a special concern for people in hospitals and long-term care facilities. To help prevent the spread of flu, many hospitals and nursing homes take these steps:  · Healthcare providers wash their hands or use an alcohol-based hand  before and after treating each patient.  · People with the flu have private rooms and bathrooms or share a room with someone with the same infection.  · People who are at high risk for the flu but don't have it are encouraged to get the flu and pneumonia vaccines.  · All healthcare workers are encouraged or required to get flu shots.   Date Last Reviewed: 12/1/2016  © 1398-1640 [employer]. [address] [place] [address], [place] [address]. All rights reserved. This information is not intended as a substitute for professional medical care. Always follow your healthcare professional's instructions.        Influenza  (Adult)    Influenza is also called the flu. It is a viral illness that affects the air passages of your lungs. It is different from the common cold. The flu can easily be passed from one to person to another. It may be spread through the air by coughing and sneezing. Or it can be spread by touching the sick person and then touching your own eyes, nose, or mouth.  The flu starts 1 to 3 days after you are exposed to the flu virus. It may last for 1 to 2 weeks but many people feel tired or fatigued for many weeks afterward. You usually dont need to take antibiotics unless you have a complication. This might be an ear or sinus infection or pneumonia.  Symptoms of the flu may be mild or severe. They can include extreme tiredness (wanting to stay in bed all day), chills, fevers, muscle aches, soreness with eye movement, headache, and a dry, hacking cough.  Home care  Follow these guidelines when caring for yourself at home:  · Avoid being around cigarette smoke, whether yours or other peoples.  · Acetaminophen or ibuprofen will help ease your fever, muscle aches, and headache. Dont give aspirin to anyone younger than 18 who has the flu. Aspirin can harm the liver.  · Nausea and loss of appetite are common with the flu. Eat light meals. Drink 6 to 8 glasses of liquids every day. Good choices are water, sport drinks, soft drinks without caffeine, juices, tea, and soup. Extra fluids will also help loosen secretions in your nose and lungs.  · Over-the-counter cold medicines will not make the flu go away faster. But the medicines may help with coughing, sore throat, and congestion in your nose and sinuses. Dont use a decongestant if you have high blood pressure.  · Stay home until your fever has been gone for at least 24 hours without using medicine to reduce fever.  Follow-up care  Follow up with your healthcare provider, or as advised, if you are not getting better over the next week.  If you are age 65 or older, talk  with your provider about getting a pneumococcal vaccine every 5 years. You should also get this vaccine if you have chronic asthma or COPD. All adults should get a flu vaccine every fall. Ask your provider about this.  When to seek medical advice  Call your healthcare provider right away if any of these occur:  · Cough with lots of colored mucus (sputum) or blood in your mucus  · Chest pain, shortness of breath, wheezing, or trouble breathing  · Severe headache, or face, neck, or ear pain  · New rash with fever  · Fever of 100.4°F (38°C) or higher, or as directed by your healthcare provider  · Confusion, behavior change, or seizure  · Severe weakness or dizziness  · You get a new fever or cough after getting better for a few days  Date Last Reviewed: 1/1/2017  © 6045-9002 [employer]. [address] [place] [address], [place] [address]. All rights reserved. This information is not intended as a substitute for professional medical care. Always follow your healthcare professional's instructions.

## 2023-08-11 ENCOUNTER — OFFICE VISIT (OUTPATIENT)
Dept: URGENT CARE | Facility: CLINIC | Age: 78
End: 2023-08-11
Payer: MEDICARE

## 2023-08-11 VITALS
SYSTOLIC BLOOD PRESSURE: 127 MMHG | BODY MASS INDEX: 23.14 KG/M2 | HEIGHT: 77 IN | RESPIRATION RATE: 16 BRPM | TEMPERATURE: 98 F | OXYGEN SATURATION: 97 % | HEART RATE: 67 BPM | DIASTOLIC BLOOD PRESSURE: 77 MMHG | WEIGHT: 196 LBS

## 2023-08-11 DIAGNOSIS — M25.511 ACUTE PAIN OF RIGHT SHOULDER: ICD-10-CM

## 2023-08-11 DIAGNOSIS — S46.911A STRAIN OF RIGHT SHOULDER, INITIAL ENCOUNTER: Primary | ICD-10-CM

## 2023-08-11 PROCEDURE — 73030 XR SHOULDER COMPLETE 2 OR MORE VIEWS RIGHT: ICD-10-PCS | Mod: RT,S$GLB,, | Performed by: RADIOLOGY

## 2023-08-11 PROCEDURE — 99203 PR OFFICE/OUTPT VISIT, NEW, LEVL III, 30-44 MIN: ICD-10-PCS | Mod: S$GLB,,, | Performed by: PHYSICIAN ASSISTANT

## 2023-08-11 PROCEDURE — 99203 OFFICE O/P NEW LOW 30 MIN: CPT | Mod: S$GLB,,, | Performed by: PHYSICIAN ASSISTANT

## 2023-08-11 PROCEDURE — 73030 X-RAY EXAM OF SHOULDER: CPT | Mod: RT,S$GLB,, | Performed by: RADIOLOGY

## 2023-08-11 RX ORDER — DICLOFENAC SODIUM 10 MG/G
2 GEL TOPICAL 4 TIMES DAILY
Qty: 100 G | Refills: 1 | Status: SHIPPED | OUTPATIENT
Start: 2023-08-11

## 2023-08-11 RX ORDER — AMLODIPINE BESYLATE 5 MG/1
5 TABLET ORAL
COMMUNITY
Start: 2023-02-27 | End: 2024-02-22

## 2023-08-11 RX ORDER — ZOLPIDEM TARTRATE 5 MG/1
5 TABLET ORAL NIGHTLY
COMMUNITY
Start: 2023-04-25

## 2023-08-11 RX ORDER — SACUBITRIL AND VALSARTAN 24; 26 MG/1; MG/1
1 TABLET, FILM COATED ORAL 2 TIMES DAILY
COMMUNITY
Start: 2023-03-01

## 2023-08-11 RX ORDER — LEVOTHYROXINE SODIUM 25 UG/1
1 TABLET ORAL EVERY MORNING
COMMUNITY
Start: 2023-01-25

## 2023-08-11 NOTE — PATIENT INSTRUCTIONS
Apply warm soaks or heating pad to shoulder a few times daily.  Conduct range of motion exercises several times daily.  You may take acetaminophen 1000 mg 4 times daily as needed.  Do not exceed 4000 mg per day.  Follow-up with your primary care provider or orthopedist if no significant improvement over the next 1-2 weeks or sooner if it gets worse.    Please review attached instructions.    You must understand that you've received an Urgent Care treatment only and that you may be released before all your medical problems are known or treated. You, the patient, will arrange for follow up care as instructed.  Follow up with your PCP or specialty clinic as directed in the next 1-2 weeks if not improved or as needed.  You may call [phone] to schedule an appointment with the appropriate provider.  If your condition worsens we recommend that you receive another evaluation at the emergency room immediately or contact your primary medical clinics after hours call service to discuss your concerns.    If you were prescribed a narcotic or controlled medication, do not drive or operate heavy equipment or machinery while taking these medications.    If you smoke, please stop smoking.
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
